# Patient Record
Sex: FEMALE | Race: WHITE | NOT HISPANIC OR LATINO | Employment: OTHER | URBAN - METROPOLITAN AREA
[De-identification: names, ages, dates, MRNs, and addresses within clinical notes are randomized per-mention and may not be internally consistent; named-entity substitution may affect disease eponyms.]

---

## 2017-07-21 ENCOUNTER — OFFICE VISIT (OUTPATIENT)
Dept: OBSTETRICS AND GYNECOLOGY | Age: 57
End: 2017-07-21

## 2017-07-21 VITALS
WEIGHT: 208 LBS | HEIGHT: 68 IN | DIASTOLIC BLOOD PRESSURE: 80 MMHG | BODY MASS INDEX: 31.52 KG/M2 | SYSTOLIC BLOOD PRESSURE: 110 MMHG

## 2017-07-21 DIAGNOSIS — Z01.419 WELL WOMAN EXAM WITH ROUTINE GYNECOLOGICAL EXAM: Primary | ICD-10-CM

## 2017-07-21 DIAGNOSIS — Z12.4 ROUTINE CERVICAL SMEAR: ICD-10-CM

## 2017-07-21 DIAGNOSIS — Z11.51 SCREENING FOR HPV (HUMAN PAPILLOMAVIRUS): ICD-10-CM

## 2017-07-21 PROCEDURE — 99396 PREV VISIT EST AGE 40-64: CPT | Performed by: OBSTETRICS & GYNECOLOGY

## 2017-07-21 NOTE — PROGRESS NOTES
Chief complaint:annual    Subjective   History of Present Illness    Ai Carreon is a 56 y.o.  who presents for annual exam. No c/o. Episode of postmenopausal bleeding last year, EMB negative, no further problems.      Obstetric History:  OB History      Para Term  AB TAB SAB Ectopic Multiple Living    3 3 3       3         Menstrual History:     No LMP recorded. Patient is postmenopausal.         Current contraception: tubal ligation  History of abnormal Pap smear: no  Received Gardasil immunization: no  Perform regular self breast exam: yes -    Family history of uterine or ovarian cancer: yes - both grandmothers had ovarian cancer, dx 80's  Family History of colon cancer: no  Family history of breast cancer: no    Mammogram: done today.  Colonoscopy: recommended.  DEXA: not indicated.    Exercise: not active  Calcium/Vitamin D: adequate intake    The following portions of the patient's history were reviewed and updated as appropriate: allergies, current medications, past family history, past medical history, past social history, past surgical history and problem list.    Review of Systems   Constitutional: Negative for activity change, fatigue, fever and unexpected weight change.   Respiratory: Negative for chest tightness and shortness of breath.    Cardiovascular: Negative for chest pain, palpitations and leg swelling.   Gastrointestinal: Negative for abdominal distention, abdominal pain, blood in stool, constipation, diarrhea, nausea and vomiting.   Endocrine: Negative for cold intolerance, heat intolerance, polydipsia, polyphagia and polyuria.   Genitourinary: Negative.    Musculoskeletal: Negative for arthralgias and back pain.   Skin: Negative for color change.   Neurological: Negative for weakness and headaches.   Hematological: Does not bruise/bleed easily.   Psychiatric/Behavioral: Negative for confusion.       Pertinent items are noted in HPI.     Objective   Physical Exam    BP  "110/80  Ht 68\" (172.7 cm)  Wt 208 lb (94.3 kg)  BMI 31.63 kg/m2    General:   alert, appears stated age and cooperative   Neck: no asymmetry, masses, or scars   Heart: regular rate and rhythm, S1, S2 normal, no murmur, click, rub or gallop   Lungs: clear to auscultation bilaterally   Abdomen: soft, non-tender, without masses or organomegaly   Breast: inspection negative, no nipple discharge or bleeding, no masses or nodularity palpable   Vulva: Bartholin's, Urethra, [place]'s normal   Vagina: normal mucosa   Cervix: no bleeding following Pap, no cervical motion tenderness and no lesions   Uterus: normal size, normal shape and consistency   Adnexa: normal adnexa and no mass, fullness, tenderness   Rectal: not indicated     Assessment/Plan   Ai was seen today for gynecologic exam.    Diagnoses and all orders for this visit:    Well woman exam with routine gynecological exam  -     PapIG, HPV, Rfx 16 / 18    Screening for HPV (human papillomavirus)  -     PapIG, HPV, Rfx 16 / 18      Breast self exam technique reviewed and patient encouraged to perform self-exam monthly.  Discussed healthy lifestyle modifications.  Pap smear sent     Recommend [facility] for BRCA gene screening, info given, pt will look into it and will probably come back for testing                 "

## 2017-07-26 LAB
CYTOLOGIST CVX/VAG CYTO: NORMAL
CYTOLOGY CVX/VAG DOC THIN PREP: NORMAL
DX ICD CODE: NORMAL
HIV 1 & 2 AB SER-IMP: NORMAL
HPV I/H RISK 4 DNA CVX QL PROBE+SIG AMP: NEGATIVE
Lab: NORMAL
OTHER STN SPEC: NORMAL
PATH REPORT.FINAL DX SPEC: NORMAL
STAT OF ADQ CVX/VAG CYTO-IMP: NORMAL

## 2017-07-27 ENCOUNTER — TELEPHONE (OUTPATIENT)
Dept: OBSTETRICS AND GYNECOLOGY | Age: 57
End: 2017-07-27

## 2018-07-27 ENCOUNTER — APPOINTMENT (OUTPATIENT)
Dept: WOMENS IMAGING | Facility: HOSPITAL | Age: 58
End: 2018-07-27

## 2018-07-27 ENCOUNTER — OFFICE VISIT (OUTPATIENT)
Dept: OBSTETRICS AND GYNECOLOGY | Age: 58
End: 2018-07-27

## 2018-07-27 VITALS
DIASTOLIC BLOOD PRESSURE: 64 MMHG | BODY MASS INDEX: 30.25 KG/M2 | HEIGHT: 68 IN | WEIGHT: 199.6 LBS | SYSTOLIC BLOOD PRESSURE: 112 MMHG

## 2018-07-27 DIAGNOSIS — Z80.41 FAMILY HISTORY OF OVARIAN CANCER: ICD-10-CM

## 2018-07-27 DIAGNOSIS — Z01.419 WELL WOMAN EXAM WITH ROUTINE GYNECOLOGICAL EXAM: Primary | ICD-10-CM

## 2018-07-27 PROCEDURE — 99396 PREV VISIT EST AGE 40-64: CPT | Performed by: OBSTETRICS & GYNECOLOGY

## 2018-07-27 PROCEDURE — 77067 SCR MAMMO BI INCL CAD: CPT | Performed by: RADIOLOGY

## 2018-07-27 NOTE — PROGRESS NOTES
Chief complaint: annual    Subjective   History of Present Illness    Ai Carreon is a 57 y.o.  who presents for annual exam. No postmenopausal bleeding. NO gyn c/o. Didn't do MyRisk testing. Was considering testing for BRCA genes due to family h/o ovarian cancer. Both grandmothers w/ ovarian cancer dx in 80s.   2017 pap normal, HPV-    Soc Hx- pre- at [facility]    Obstetric History:  OB History      Para Term  AB Living    3 3 3     3    SAB TAB Ectopic Molar Multiple Live Births              3         Menstrual History:     No LMP recorded. Patient is postmenopausal.         Current contraception: tubal ligation  History of abnormal Pap smear: no  Received Gardasil immunization:    Perform regular self breast exam: yes  Family history of uterine or ovarian cancer: yes, bother grandmother w/ ovarian cancer dx 80s  Family History of colon cancer: no  Family history of breast cancer: no    Mammogram: done today.  Colonoscopy: recommended.  DEXA: not indicated.    Exercise: moderately active  Calcium/Vitamin D: adequate intake    The following portions of the patient's history were reviewed and updated as appropriate: allergies, current medications, past family history, past medical history, past social history, past surgical history and problem list.    Review of Systems   Constitutional: Negative for activity change, fatigue, fever and unexpected weight change.   Respiratory: Negative for chest tightness and shortness of breath.    Cardiovascular: Negative for chest pain, palpitations and leg swelling.   Gastrointestinal: Negative for abdominal distention, abdominal pain, blood in stool, constipation, diarrhea, nausea and vomiting.   Endocrine: Negative for cold intolerance, heat intolerance, polydipsia, polyphagia and polyuria.   Genitourinary: Negative.    Musculoskeletal: Negative for arthralgias and back pain.   Skin: Negative for color change.  "  Neurological: Negative for weakness and headaches.   Hematological: Does not bruise/bleed easily.   Psychiatric/Behavioral: Negative for confusion.       Pertinent items are noted in HPI.     Objective   Physical Exam    /64   Ht 172.7 cm (68\")   Wt 90.5 kg (199 lb 9.6 oz)   BMI 30.35 kg/m²     General:   alert, appears stated age and cooperative   Neck: no asymmetry, masses, or scars   Heart: regular rate and rhythm, S1, S2 normal, no murmur, click, rub or gallop   Lungs: clear to auscultation bilaterally   Abdomen: soft, non-tender, without masses or organomegaly   Breast: inspection negative, no nipple discharge or bleeding, no masses or nodularity palpable   Vulva: normal, Bartholin's, Urethra, [place]'s normal   Vagina: normal mucosa   Cervix: no cervical motion tenderness and no lesions   Uterus: normal size, mobile, non-tender, normal shape and consistency   Adnexa: normal adnexa and no mass, fullness, tenderness   Rectal: not indicated     Assessment/Plan   Ai was seen today for gynecologic exam.    Diagnoses and all orders for this visit:    Well woman exam with routine gynecological exam    Family history of ovarian cancer    pt desires testing for cancer genes, info given for [facility]    Recommend screening colonoscopy q 10 yrs      Breast self exam technique reviewed and patient encouraged to perform self-exam monthly.  Discussed healthy lifestyle modifications.  Pap smear up to date, plan q 3 yrs                 "

## 2019-08-07 ENCOUNTER — OFFICE VISIT (OUTPATIENT)
Dept: OBSTETRICS AND GYNECOLOGY | Age: 59
End: 2019-08-07

## 2019-08-07 ENCOUNTER — APPOINTMENT (OUTPATIENT)
Dept: WOMENS IMAGING | Facility: HOSPITAL | Age: 59
End: 2019-08-07

## 2019-08-07 ENCOUNTER — PROCEDURE VISIT (OUTPATIENT)
Dept: OBSTETRICS AND GYNECOLOGY | Age: 59
End: 2019-08-07

## 2019-08-07 VITALS
HEIGHT: 68 IN | BODY MASS INDEX: 28.95 KG/M2 | DIASTOLIC BLOOD PRESSURE: 72 MMHG | SYSTOLIC BLOOD PRESSURE: 140 MMHG | WEIGHT: 191 LBS

## 2019-08-07 DIAGNOSIS — Z01.419 WELL WOMAN EXAM WITH ROUTINE GYNECOLOGICAL EXAM: Primary | ICD-10-CM

## 2019-08-07 DIAGNOSIS — Z12.31 VISIT FOR SCREENING MAMMOGRAM: Primary | ICD-10-CM

## 2019-08-07 DIAGNOSIS — Z80.41 FAMILY HISTORY OF OVARIAN CANCER: ICD-10-CM

## 2019-08-07 DIAGNOSIS — Z12.11 SCREENING FOR COLON CANCER: ICD-10-CM

## 2019-08-07 PROCEDURE — 99396 PREV VISIT EST AGE 40-64: CPT | Performed by: OBSTETRICS & GYNECOLOGY

## 2019-08-07 PROCEDURE — 77067 SCR MAMMO BI INCL CAD: CPT | Performed by: RADIOLOGY

## 2019-08-07 PROCEDURE — 77067 SCR MAMMO BI INCL CAD: CPT | Performed by: OBSTETRICS & GYNECOLOGY

## 2019-08-07 NOTE — PROGRESS NOTES
"Chief complaint:    Subjective   History of Present Illness    Ai Carreon is a 58 y.o.  who presents for annual exam. No c/o  Her menses are absent, no postmenopausal bleeding. Unable to find new primary care provider.  2017 pap normal HPV-  MMG today  Needs colonoscopy    Obstetric History:  OB History      Para Term  AB Living    3 3 3     3    SAB TAB Ectopic Molar Multiple Live Births              3         Menstrual History:     No LMP recorded. Patient is postmenopausal.   Exercise: moderately active  Calcium/Vitamin D: adequate intake    The following portions of the patient's history were reviewed and updated as appropriate: allergies, current medications, past family history, past medical history, past social history, past surgical history and problem list.    Review of Systems   All systems negative    Pertinent items are noted in HPI.     Objective   Physical Exam    /72   Ht 172.7 cm (68\")   Wt 86.6 kg (191 lb)   Breastfeeding? No   BMI 29.04 kg/m²     General:   alert, appears stated age and cooperative   Neck: no asymmetry, masses, or scars   Heart: regular rate and rhythm, S1, S2 normal, no murmur, click, rub or gallop   Lungs: clear to auscultation bilaterally   Abdomen: soft, non-tender, without masses or organomegaly   Breast: inspection negative, no nipple discharge or bleeding, no masses or nodularity palpable   Vulva: normal, Bartholin's, Urethra, [place]'s normal   Vagina: normal mucosa   Cervix: no bleeding following Pap   Uterus: normal size, anteverted, mobile, non-tender, normal shape and consistency   Adnexa: normal adnexa and no mass, fullness, tenderness   Rectal: not indicated     Assessment/Plan   Ai was seen today for gynecologic exam.    Diagnoses and all orders for this visit:    Well woman exam with routine gynecological exam    Screening for colon cancer  -     Ambulatory Referral For Screening Colonoscopy    Family history of ovarian " cancer    pt's mother is BRCA gene negative (therefore pt does not need to be tested)    Breast self exam technique reviewed and patient encouraged to perform self-exam monthly.  Discussed healthy lifestyle modifications.  Pap smear up to date    Referred to primary care provider (numbers given)

## 2019-08-19 ENCOUNTER — PREP FOR SURGERY (OUTPATIENT)
Dept: OTHER | Facility: HOSPITAL | Age: 59
End: 2019-08-19

## 2019-08-19 DIAGNOSIS — Z12.11 SCREENING FOR COLON CANCER: Primary | ICD-10-CM

## 2019-09-11 ENCOUNTER — OFFICE VISIT (OUTPATIENT)
Dept: FAMILY MEDICINE CLINIC | Facility: CLINIC | Age: 59
End: 2019-09-11

## 2019-09-11 VITALS
WEIGHT: 188.8 LBS | BODY MASS INDEX: 28.61 KG/M2 | HEIGHT: 68 IN | SYSTOLIC BLOOD PRESSURE: 132 MMHG | HEART RATE: 84 BPM | TEMPERATURE: 97.7 F | OXYGEN SATURATION: 94 % | DIASTOLIC BLOOD PRESSURE: 84 MMHG

## 2019-09-11 DIAGNOSIS — R73.9 HYPERGLYCEMIA: Primary | ICD-10-CM

## 2019-09-11 DIAGNOSIS — E11.9 TYPE 2 DIABETES MELLITUS WITHOUT COMPLICATION, WITHOUT LONG-TERM CURRENT USE OF INSULIN (HCC): ICD-10-CM

## 2019-09-11 LAB — HBA1C MFR BLD: 12.6 %

## 2019-09-11 PROCEDURE — 83036 HEMOGLOBIN GLYCOSYLATED A1C: CPT | Performed by: FAMILY MEDICINE

## 2019-09-11 PROCEDURE — 99204 OFFICE O/P NEW MOD 45 MIN: CPT | Performed by: FAMILY MEDICINE

## 2019-09-11 RX ORDER — METFORMIN HYDROCHLORIDE EXTENDED-RELEASE TABLETS 1000 MG/1
1000 TABLET, FILM COATED, EXTENDED RELEASE ORAL 2 TIMES DAILY WITH MEALS
Qty: 180 TABLET | Refills: 3 | Status: SHIPPED | OUTPATIENT
Start: 2019-09-11 | End: 2019-10-14 | Stop reason: CLARIF

## 2019-09-11 NOTE — PROGRESS NOTES
Subjective   Ai Carreon is a 58 y.o. female.     Chief Complaint   Patient presents with   • Blood Sugar Problem       History of Present Illness   Had a random BS of 300 a week ago. Has not had a diagnosis of DM2 before and is here to see if she has this diagnosis. No thirst or other symptoms etc. A1C 12.4 today. Unknown how long this has gone on. Discussed keeping BS log etc. BS worse when she eats wrong. Has tested a few times. severely uncontrolled.     The following portions of the patient's history were reviewed and updated as appropriate: allergies, current medications, past family history, past medical history, past social history, past surgical history and problem list.    Past Medical History:   Diagnosis Date   • Kidney stone        Past Surgical History:   Procedure Laterality Date   • PELVIC LAPAROSCOPY  2002    LSO, serous cystadenoma   • TUBAL ABDOMINAL LIGATION         Family History   Problem Relation Age of Onset   • No Known Problems Mother    • Coronary artery disease Father    • No Known Problems Sister    • No Known Problems Brother    • No Known Problems Daughter    • No Known Problems Son    • Ovarian cancer Maternal Grandmother    • No Known Problems Maternal Aunt    • No Known Problems Paternal Aunt    • BRCA 1/2 Neg Hx    • Breast cancer Neg Hx    • Colon cancer Neg Hx    • Endometrial cancer Neg Hx    • Uterine cancer Neg Hx        Social History     Socioeconomic History   • Marital status:      Spouse name: Not on file   • Number of children: Not on file   • Years of education: Not on file   • Highest education level: Not on file   Tobacco Use   • Smoking status: Never Smoker   • Smokeless tobacco: Never Used   Substance and Sexual Activity   • Alcohol use: No   • Drug use: No       Review of Systems   Constitutional: Negative for fatigue and fever.   HENT: Negative for ear pain and hearing loss.    Eyes: Negative for pain and visual disturbance.   Respiratory: Negative for  "chest tightness and shortness of breath.    Cardiovascular: Negative for chest pain and palpitations.   Gastrointestinal: Negative for constipation and diarrhea.   Genitourinary: Negative for dysuria and urinary incontinence.   Musculoskeletal: Negative for arthralgias and myalgias.   Skin: Negative for rash and skin lesions.   Neurological: Negative for headache and memory problem.   Psychiatric/Behavioral: Negative for depressed mood. The patient is not nervous/anxious.        Objective   Visit Vitals  /84 (BP Location: Right arm, Patient Position: Sitting)   Pulse 84   Temp 97.7 °F (36.5 °C)   Ht 171.5 cm (67.5\")   Wt 85.6 kg (188 lb 12.8 oz)   SpO2 94%   BMI 29.13 kg/m²     Body mass index is 29.13 kg/m².  Physical Exam   Constitutional: She is oriented to person, place, and time. She appears well-developed and well-nourished.   Cardiovascular: Normal rate, regular rhythm, normal heart sounds and intact distal pulses.   Pulmonary/Chest: Effort normal and breath sounds normal.   Musculoskeletal: Normal range of motion. She exhibits no edema.   Neurological: She is alert and oriented to person, place, and time.   Skin: Skin is warm and dry.   Psychiatric: She has a normal mood and affect. Her behavior is normal.         Assessment/Plan   Ai was seen today for blood sugar problem.    Diagnoses and all orders for this visit:    Hyperglycemia  -     POC Glycosylated Hemoglobin (Hb A1C)    Type 2 diabetes mellitus without complication, without long-term current use of insulin (CMS/[facility])  -     metFORMIN (FORTAMET) 1000 MG (OSM) 24 hr tablet; Take 1 tablet by mouth 2 (Two) Times a Day With Meals.  -     Ambulatory Referral to Diabetic Education  -     Lipid Panel  -     Comprehensive Metabolic Panel  -     TSH Rfx On Abnormal To Free T4  -     CBC & Differential        R and B discussed and keep BS log and f/u in 1 month. Discussed diet. Gave glucometer script.        "

## 2019-09-12 ENCOUNTER — TELEPHONE (OUTPATIENT)
Dept: FAMILY MEDICINE CLINIC | Facility: CLINIC | Age: 59
End: 2019-09-12

## 2019-09-12 LAB
ALBUMIN SERPL-MCNC: 4.7 G/DL (ref 3.5–5.2)
ALBUMIN/GLOB SERPL: 2 G/DL
ALP SERPL-CCNC: 171 U/L (ref 39–117)
ALT SERPL-CCNC: 63 U/L (ref 1–33)
AST SERPL-CCNC: 38 U/L (ref 1–32)
BASOPHILS # BLD AUTO: 0.02 10*3/MM3 (ref 0–0.2)
BASOPHILS NFR BLD AUTO: 0.4 % (ref 0–1.5)
BILIRUB SERPL-MCNC: 0.5 MG/DL (ref 0.2–1.2)
BUN SERPL-MCNC: 15 MG/DL (ref 6–20)
BUN/CREAT SERPL: 22.1 (ref 7–25)
CALCIUM SERPL-MCNC: 10.7 MG/DL (ref 8.6–10.5)
CHLORIDE SERPL-SCNC: 98 MMOL/L (ref 98–107)
CHOLEST SERPL-MCNC: 234 MG/DL (ref 0–200)
CO2 SERPL-SCNC: 26.5 MMOL/L (ref 22–29)
CREAT SERPL-MCNC: 0.68 MG/DL (ref 0.57–1)
EOSINOPHIL # BLD AUTO: 0.09 10*3/MM3 (ref 0–0.4)
EOSINOPHIL NFR BLD AUTO: 2 % (ref 0.3–6.2)
ERYTHROCYTE [DISTWIDTH] IN BLOOD BY AUTOMATED COUNT: 12.3 % (ref 12.3–15.4)
GLOBULIN SER CALC-MCNC: 2.3 GM/DL
GLUCOSE SERPL-MCNC: 224 MG/DL (ref 65–99)
HCT VFR BLD AUTO: 46.9 % (ref 34–46.6)
HDLC SERPL-MCNC: 33 MG/DL (ref 40–60)
HGB BLD-MCNC: 15.1 G/DL (ref 12–15.9)
IMM GRANULOCYTES # BLD AUTO: 0.02 10*3/MM3 (ref 0–0.05)
IMM GRANULOCYTES NFR BLD AUTO: 0.4 % (ref 0–0.5)
LDLC SERPL CALC-MCNC: 140 MG/DL (ref 0–100)
LYMPHOCYTES # BLD AUTO: 0.95 10*3/MM3 (ref 0.7–3.1)
LYMPHOCYTES NFR BLD AUTO: 21.3 % (ref 19.6–45.3)
MCH RBC QN AUTO: 29.7 PG (ref 26.6–33)
MCHC RBC AUTO-ENTMCNC: 32.2 G/DL (ref 31.5–35.7)
MCV RBC AUTO: 92.3 FL (ref 79–97)
MONOCYTES # BLD AUTO: 0.44 10*3/MM3 (ref 0.1–0.9)
MONOCYTES NFR BLD AUTO: 9.8 % (ref 5–12)
NEUTROPHILS # BLD AUTO: 2.95 10*3/MM3 (ref 1.7–7)
NEUTROPHILS NFR BLD AUTO: 66.1 % (ref 42.7–76)
NRBC BLD AUTO-RTO: 0 /100 WBC (ref 0–0.2)
PLATELET # BLD AUTO: 194 10*3/MM3 (ref 140–450)
POTASSIUM SERPL-SCNC: 4.9 MMOL/L (ref 3.5–5.2)
PROT SERPL-MCNC: 7 G/DL (ref 6–8.5)
RBC # BLD AUTO: 5.08 10*6/MM3 (ref 3.77–5.28)
SODIUM SERPL-SCNC: 138 MMOL/L (ref 136–145)
TRIGL SERPL-MCNC: 305 MG/DL (ref 0–150)
TSH SERPL DL<=0.005 MIU/L-ACNC: 1.05 UIU/ML (ref 0.27–4.2)
VLDLC SERPL CALC-MCNC: 61 MG/DL
WBC # BLD AUTO: 4.47 10*3/MM3 (ref 3.4–10.8)

## 2019-09-12 NOTE — TELEPHONE ENCOUNTER
Pt called and stated the Metformin 1000mg ER is not cost effective ($4700.00). Pt contacted her insurance and they informed her Metformin 500mg ER or Metformin 750mg ER is more cost effective.

## 2019-09-13 DIAGNOSIS — E78.2 MIXED HYPERLIPIDEMIA: Primary | ICD-10-CM

## 2019-09-13 DIAGNOSIS — E11.9 TYPE 2 DIABETES MELLITUS WITHOUT COMPLICATION, WITHOUT LONG-TERM CURRENT USE OF INSULIN (HCC): Primary | ICD-10-CM

## 2019-09-13 DIAGNOSIS — E78.2 MIXED HYPERLIPIDEMIA: ICD-10-CM

## 2019-09-13 RX ORDER — ATORVASTATIN CALCIUM 20 MG/1
20 TABLET, FILM COATED ORAL DAILY
Qty: 90 TABLET | Refills: 3 | Status: SHIPPED | OUTPATIENT
Start: 2019-09-13 | End: 2019-09-13 | Stop reason: SDUPTHER

## 2019-09-13 RX ORDER — ATORVASTATIN CALCIUM 20 MG/1
20 TABLET, FILM COATED ORAL DAILY
Qty: 90 TABLET | Refills: 3 | Status: SHIPPED | OUTPATIENT
Start: 2019-09-13 | End: 2019-10-14 | Stop reason: SDUPTHER

## 2019-09-13 NOTE — TELEPHONE ENCOUNTER
PT CALLED AND REQUESTED THE NEW RX FOR METFORIM 500 PLEASE BE SENT INTO THE [facility], I ADDED TO CHART, PLEASE LIVIA PT WHEN RX IS SENT OVER!!!!! THANK YOU

## 2019-09-18 DIAGNOSIS — E11.9 TYPE 2 DIABETES MELLITUS WITHOUT COMPLICATION, WITHOUT LONG-TERM CURRENT USE OF INSULIN (HCC): Primary | ICD-10-CM

## 2019-09-18 RX ORDER — METFORMIN HYDROCHLORIDE 500 MG/1
1000 TABLET, EXTENDED RELEASE ORAL 2 TIMES DAILY
Qty: 360 TABLET | Refills: 2 | Status: SHIPPED | OUTPATIENT
Start: 2019-09-18 | End: 2019-10-31 | Stop reason: SDUPTHER

## 2019-10-01 ENCOUNTER — HOSPITAL ENCOUNTER (OUTPATIENT)
Dept: DIABETES SERVICES | Facility: HOSPITAL | Age: 59
Setting detail: RECURRING SERIES
Discharge: HOME | End: 2019-10-01

## 2019-10-01 PROCEDURE — G0109 DIAB MANAGE TRN IND/GROUP: HCPCS | Performed by: DIETITIAN, REGISTERED

## 2019-10-01 PROCEDURE — 97802 MEDICAL NUTRITION INDIV IN: CPT | Performed by: DIETITIAN, REGISTERED

## 2019-10-08 ENCOUNTER — APPOINTMENT (OUTPATIENT)
Dept: DIABETES SERVICES | Facility: HOSPITAL | Age: 59
End: 2019-10-08

## 2019-10-10 ENCOUNTER — ANESTHESIA (OUTPATIENT)
Dept: GASTROENTEROLOGY | Facility: HOSPITAL | Age: 59
End: 2019-10-10

## 2019-10-10 ENCOUNTER — HOSPITAL ENCOUNTER (OUTPATIENT)
Facility: HOSPITAL | Age: 59
Setting detail: HOSPITAL OUTPATIENT SURGERY
Discharge: HOME | End: 2019-10-10
Attending: COLON & RECTAL SURGERY | Admitting: COLON & RECTAL SURGERY

## 2019-10-10 ENCOUNTER — ANESTHESIA EVENT (OUTPATIENT)
Dept: GASTROENTEROLOGY | Facility: HOSPITAL | Age: 59
End: 2019-10-10

## 2019-10-10 VITALS
HEART RATE: 68 BPM | HEIGHT: 67 IN | OXYGEN SATURATION: 96 % | DIASTOLIC BLOOD PRESSURE: 62 MMHG | SYSTOLIC BLOOD PRESSURE: 98 MMHG | BODY MASS INDEX: 27.78 KG/M2 | TEMPERATURE: 98.2 F | RESPIRATION RATE: 18 BRPM | WEIGHT: 177 LBS

## 2019-10-10 DIAGNOSIS — Z12.11 SCREENING FOR COLON CANCER: ICD-10-CM

## 2019-10-10 LAB — GLUCOSE BLDC GLUCOMTR-MCNC: 100 MG/DL (ref 70–130)

## 2019-10-10 PROCEDURE — 82962 GLUCOSE BLOOD TEST: CPT

## 2019-10-10 PROCEDURE — 25010000002 PROPOFOL 10 MG/ML EMULSION: Performed by: NURSE ANESTHETIST, CERTIFIED REGISTERED

## 2019-10-10 PROCEDURE — 88305 TISSUE EXAM BY PATHOLOGIST: CPT | Performed by: COLON & RECTAL SURGERY

## 2019-10-10 PROCEDURE — 45380 COLONOSCOPY AND BIOPSY: CPT | Performed by: COLON & RECTAL SURGERY

## 2019-10-10 RX ORDER — SODIUM CHLORIDE 0.9 % (FLUSH) 0.9 %
10 SYRINGE (ML) INJECTION AS NEEDED
Status: DISCONTINUED | OUTPATIENT
Start: 2019-10-10 | End: 2019-10-10 | Stop reason: HOSPADM

## 2019-10-10 RX ORDER — PROPOFOL 10 MG/ML
VIAL (ML) INTRAVENOUS AS NEEDED
Status: DISCONTINUED | OUTPATIENT
Start: 2019-10-10 | End: 2019-10-10 | Stop reason: SURG

## 2019-10-10 RX ORDER — LIDOCAINE HYDROCHLORIDE 10 MG/ML
0.5 INJECTION, SOLUTION INFILTRATION; PERINEURAL ONCE AS NEEDED
Status: DISCONTINUED | OUTPATIENT
Start: 2019-10-10 | End: 2019-10-10 | Stop reason: HOSPADM

## 2019-10-10 RX ORDER — LIDOCAINE HYDROCHLORIDE 20 MG/ML
INJECTION, SOLUTION INFILTRATION; PERINEURAL AS NEEDED
Status: DISCONTINUED | OUTPATIENT
Start: 2019-10-10 | End: 2019-10-10 | Stop reason: SURG

## 2019-10-10 RX ORDER — SODIUM CHLORIDE, SODIUM LACTATE, POTASSIUM CHLORIDE, CALCIUM CHLORIDE 600; 310; 30; 20 MG/100ML; MG/100ML; MG/100ML; MG/100ML
1000 INJECTION, SOLUTION INTRAVENOUS CONTINUOUS
Status: DISCONTINUED | OUTPATIENT
Start: 2019-10-10 | End: 2019-10-10 | Stop reason: HOSPADM

## 2019-10-10 RX ORDER — PROPOFOL 10 MG/ML
VIAL (ML) INTRAVENOUS CONTINUOUS PRN
Status: DISCONTINUED | OUTPATIENT
Start: 2019-10-10 | End: 2019-10-10 | Stop reason: SURG

## 2019-10-10 RX ADMIN — PROPOFOL 80 MG: 10 INJECTION, EMULSION INTRAVENOUS at 14:09

## 2019-10-10 RX ADMIN — LIDOCAINE HYDROCHLORIDE 60 MG: 20 INJECTION, SOLUTION INFILTRATION; PERINEURAL at 14:09

## 2019-10-10 RX ADMIN — PROPOFOL 250 MCG/KG/MIN: 10 INJECTION, EMULSION INTRAVENOUS at 14:09

## 2019-10-10 RX ADMIN — SODIUM CHLORIDE, POTASSIUM CHLORIDE, SODIUM LACTATE AND CALCIUM CHLORIDE 1000 ML: 600; 310; 30; 20 INJECTION, SOLUTION INTRAVENOUS at 13:03

## 2019-10-10 NOTE — H&P
Ai Carreon is a 59 y.o. female  who is referred by Jacob Vasquez MD for a colonoscopy. She is an  has a history of screening for colon cancer.     She denies any change in bowel function, melena, or hematochezia.    Past Medical History:   Diagnosis Date   • Diabetes mellitus (CMS/HCC)     TYPE 2   • Hyperglycemia    • Hyperlipidemia     MIXED   • Kidney stone     SEEN IN PAST BY DR. HARDEEP VILLASENOR   • Pericarditis 1977   • Postmenopausal    • Right ovarian cyst 2002    S/P EXCISION       Past Surgical History:   Procedure Laterality Date   • CYSTOSCOPY INSERTION / REMOVAL STENT / STONE N/A 10/31/2001    PLACEMENT OF LEFT DOUBLE J STENT, 6x24, DR. HARDEEP VILLASENOR AT [facility]   • LAPAROSCOPIC SALPINGOOPHERECTOMY Left 01/24/2002     AND EXCISION OF RIGHT OVARIAN CYST, DR. CHANDLER HAIR AT [facility]   • TUBAL ABDOMINAL LIGATION Bilateral 1990    POSTPARTUM   • URETEROSCOPY STENT INSERTION N/A 07/13/2009    WITH STONE EXTRACTION, DR. HARDEEP VILLASENOR AT [facility]       Medications Prior to Admission   Medication Sig Dispense Refill Last Dose   • metFORMIN ER (GLUCOPHAGE XR) 500 MG 24 hr tablet Take 2 tablets by mouth 2 (Two) Times a Day. 360 tablet 2 10/9/2019 at Unknown time   • atorvastatin (LIPITOR) 20 MG tablet Take 1 tablet by mouth Daily. 90 tablet 3 Unknown at Unknown time   • metFORMIN (FORTAMET) 1000 MG (OSM) 24 hr tablet Take 1 tablet by mouth 2 (Two) Times a Day With Meals. 180 tablet 3    • metFORMIN (GLUCOPHAGE) 500 MG tablet Take 1 tablet by mouth 2 (Two) Times a Day With Meals for 30 days. 60 tablet 1        No Known Allergies    Family History   Problem Relation Age of Onset   • Melanoma Mother    • Cancer Mother    • Coronary artery disease Father    • Prostate cancer Father    • Diabetes Father    • Heart disease Father    • No Known Problems Sister    • Cancer Brother    • Melanoma Brother    • Prostate cancer Brother    • No Known Problems Daughter    • No Known Problems Son    • Ovarian cancer Maternal Grandmother    •  Cancer Maternal Grandmother    • No Known Problems Maternal Aunt    • No Known Problems Paternal Aunt    • Ovarian cancer Paternal Grandmother    • Cancer Paternal Grandmother    • BRCA 1/2 Neg Hx    • Breast cancer Neg Hx    • Colon cancer Neg Hx    • Endometrial cancer Neg Hx    • Uterine cancer Neg Hx        Social History     Socioeconomic History   • Marital status:      Spouse name: Not on file   • Number of children: Not on file   • Years of education: Not on file   • Highest education level: Not on file   Tobacco Use   • Smoking status: Never Smoker   • Smokeless tobacco: Never Used   Substance and Sexual Activity   • Alcohol use: No   • Drug use: No   • Sexual activity: Defer     Birth control/protection: Post-menopausal, Surgical       Review of Systems   Gastrointestinal: Negative for abdominal pain, nausea and vomiting.   All other systems reviewed and are negative.      Vitals:    10/10/19 1252   BP: 117/72   Pulse: 63   Resp: 20   Temp: 98.2 °F (36.8 °C)   SpO2: 97%         Physical Exam   Constitutional: She is oriented to person, place, and time. She appears well-developed and well-nourished.   HENT:   Head: Normocephalic and atraumatic.   Eyes: EOM are normal. Pupils are equal, round, and reactive to light.   Cardiovascular: Regular rhythm.   Pulmonary/Chest: Effort normal.   Abdominal: Soft. She exhibits no distension.   Musculoskeletal: Normal range of motion.   Neurological: She is alert and oriented to person, place, and time.   Skin: Skin is warm and dry.   Psychiatric: Judgment and thought content normal.         Assessment/Plan      screening for colon cancer.         I recommend colonoscopy.  I described risks, benefits of the procedure with the patient including but not limited to bleeding, infection, possibility of perforation and possible polypectomy. All of the patient's questions were answered and they would like to proceed with the above recommendations.

## 2019-10-10 NOTE — ANESTHESIA POSTPROCEDURE EVALUATION
Patient: Ai Carreon    Procedure Summary     Date:  10/10/19 Room / Location:  [facility] ENDOSCOPY 9 / [facility] ENDOSCOPY    Anesthesia Start:  1404 Anesthesia Stop:  1432    Procedure:  COLONOSCOPY to cecum with biopsies and polypectomy (N/A ) Diagnosis:       Screening for colon cancer      (Screening for colon cancer [Z12.11])    Surgeon:  Jacob Vasquez MD Provider:  Jl De MD    Anesthesia Type:  MAC ASA Status:  3          Anesthesia Type: MAC  Last vitals  BP   90/48 (10/10/19 1432)   Temp   36.8 °C (98.2 °F) (10/10/19 1252)   Pulse   64 (10/10/19 1432)   Resp   18 (10/10/19 1432)     SpO2   98 % (10/10/19 1432)     Post Anesthesia Care and Evaluation    Patient location during evaluation: bedside  Patient participation: complete - patient participated  Level of consciousness: awake and alert  Pain management: adequate  Airway patency: patent  Anesthetic complications: No anesthetic complications  PONV Status: controlled  Cardiovascular status: blood pressure returned to baseline and acceptable  Respiratory status: acceptable  Hydration status: acceptable

## 2019-10-11 LAB
CYTO UR: NORMAL
LAB AP CASE REPORT: NORMAL
PATH REPORT.FINAL DX SPEC: NORMAL
PATH REPORT.GROSS SPEC: NORMAL

## 2019-10-14 ENCOUNTER — OFFICE VISIT (OUTPATIENT)
Dept: FAMILY MEDICINE CLINIC | Facility: CLINIC | Age: 59
End: 2019-10-14

## 2019-10-14 VITALS
SYSTOLIC BLOOD PRESSURE: 130 MMHG | DIASTOLIC BLOOD PRESSURE: 72 MMHG | HEART RATE: 61 BPM | TEMPERATURE: 98.4 F | BODY MASS INDEX: 27.58 KG/M2 | HEIGHT: 68 IN | WEIGHT: 182 LBS | OXYGEN SATURATION: 97 %

## 2019-10-14 DIAGNOSIS — E78.2 MIXED HYPERLIPIDEMIA: ICD-10-CM

## 2019-10-14 DIAGNOSIS — E11.9 TYPE 2 DIABETES MELLITUS WITHOUT COMPLICATION, WITHOUT LONG-TERM CURRENT USE OF INSULIN (HCC): Primary | ICD-10-CM

## 2019-10-14 DIAGNOSIS — E06.1 DE QUERVAIN THYROIDITIS: ICD-10-CM

## 2019-10-14 PROCEDURE — 99214 OFFICE O/P EST MOD 30 MIN: CPT | Performed by: FAMILY MEDICINE

## 2019-10-14 RX ORDER — ATORVASTATIN CALCIUM 20 MG/1
20 TABLET, FILM COATED ORAL DAILY
Qty: 90 TABLET | Refills: 3 | Status: SHIPPED | OUTPATIENT
Start: 2019-10-14 | End: 2020-01-28 | Stop reason: SDUPTHER

## 2019-10-14 NOTE — PROGRESS NOTES
Subjective   Ai Carreon is a 59 y.o. female.     Chief Complaint   Patient presents with   • Diabetes     Follow up        History of Present Illness   dm2- doing well on meds, fasting bs 115 on average. No SE. Has seen dietician and working hard on diet. Has lost 6 pounds.     hld- did not start medication as it was too expensive and needs it sent to [facility]    Wrist pain for a year since she had an injury falling on it. Worse with picking up something heavy or throwing. Sharp. In wrist and base of thumb. Using her phone a lot recently.     The following portions of the patient's history were reviewed and updated as appropriate: allergies, current medications, past family history, past medical history, past social history, past surgical history and problem list.    Past Medical History:   Diagnosis Date   • Diabetes mellitus (CMS/HCC)     TYPE 2   • Hyperglycemia    • Hyperlipidemia     MIXED   • Kidney stone     SEEN IN PAST BY DR. HARDEEP VILLASENOR   • Pericarditis 1977   • Postmenopausal    • Right ovarian cyst 2002    S/P EXCISION       Past Surgical History:   Procedure Laterality Date   • COLONOSCOPY N/A 10/10/2019    Procedure: COLONOSCOPY to cecum with biopsies and polypectomy;  Surgeon: Jacob Vasquez MD;  Location: [facility] ENDOSCOPY;  Service: General   • CYSTOSCOPY INSERTION / REMOVAL STENT / STONE N/A 10/31/2001    PLACEMENT OF LEFT DOUBLE J STENT, 6x24, DR. HARDEEP VILLASENOR AT [facility]   • LAPAROSCOPIC SALPINGOOPHERECTOMY Left 01/24/2002     AND EXCISION OF RIGHT OVARIAN CYST, DR. CHANDLER HAIR AT [facility]   • TUBAL ABDOMINAL LIGATION Bilateral 1990    POSTPARTUM   • URETEROSCOPY STENT INSERTION N/A 07/13/2009    WITH STONE EXTRACTION, DR. HARDEEP VILLASENOR AT [facility]       Family History   Problem Relation Age of Onset   • Melanoma Mother    • Cancer Mother    • Coronary artery disease Father    • Prostate cancer Father    • Diabetes Father    • Heart disease Father    • No Known Problems Sister    • Cancer Brother    •  "Melanoma Brother    • Prostate cancer Brother    • No Known Problems Daughter    • No Known Problems Son    • Ovarian cancer Maternal Grandmother    • Cancer Maternal Grandmother    • No Known Problems Maternal Aunt    • No Known Problems Paternal Aunt    • Ovarian cancer Paternal Grandmother    • Cancer Paternal Grandmother    • BRCA 1/2 Neg Hx    • Breast cancer Neg Hx    • Colon cancer Neg Hx    • Endometrial cancer Neg Hx    • Uterine cancer Neg Hx        Social History     Socioeconomic History   • Marital status:      Spouse name: Not on file   • Number of children: Not on file   • Years of education: Not on file   • Highest education level: Not on file   Tobacco Use   • Smoking status: Never Smoker   • Smokeless tobacco: Never Used   Substance and Sexual Activity   • Alcohol use: No   • Drug use: No   • Sexual activity: Defer     Birth control/protection: Post-menopausal, Surgical       Review of Systems   Respiratory: Negative for shortness of breath.    Cardiovascular: Negative for chest pain.       Objective   Visit Vitals  /72   Pulse 61   Temp 98.4 °F (36.9 °C) (Temporal)   Ht 171.5 cm (67.5\")   Wt 82.6 kg (182 lb)   SpO2 97%   BMI 28.08 kg/m²     Body mass index is 28.08 kg/m².  Physical Exam   Constitutional: She is oriented to person, place, and time. She appears well-developed and well-nourished.   Cardiovascular: Normal rate, regular rhythm, normal heart sounds and intact distal pulses.   Pulmonary/Chest: Effort normal and breath sounds normal.   Musculoskeletal: Normal range of motion. She exhibits no edema.   Right finkelstein positive   Neurological: She is alert and oriented to person, place, and time.   Skin: Skin is warm and dry.   Psychiatric: She has a normal mood and affect. Her behavior is normal.         Assessment/Plan   Ai was seen today for diabetes.    Diagnoses and all orders for this visit:    Type 2 diabetes mellitus without complication, without long-term current " use of insulin (CMS/[facility])  -     Comprehensive Metabolic Panel  -     Hemoglobin A1c  -     glucose blood test strip; Use as instructed    Mixed hyperlipidemia  -     atorvastatin (LIPITOR) 20 MG tablet; Take 1 tablet by mouth Daily.    De Quervain thyroiditis    Other orders  -     Cancel: Lipid Panel        Rest, ice and f/u if worse or no better. Declined seeing hand. Cont meds, f/u in 3 months.

## 2019-10-15 ENCOUNTER — HOSPITAL ENCOUNTER (OUTPATIENT)
Dept: DIABETES SERVICES | Facility: HOSPITAL | Age: 59
Setting detail: RECURRING SERIES
Discharge: HOME | End: 2019-10-15

## 2019-10-15 ENCOUNTER — APPOINTMENT (OUTPATIENT)
Dept: DIABETES SERVICES | Facility: HOSPITAL | Age: 59
End: 2019-10-15

## 2019-10-15 DIAGNOSIS — E83.52 HYPERCALCEMIA: Primary | ICD-10-CM

## 2019-10-15 LAB
ALBUMIN SERPL-MCNC: 4.6 G/DL (ref 3.5–5.2)
ALBUMIN/GLOB SERPL: 1.9 G/DL
ALP SERPL-CCNC: 119 U/L (ref 39–117)
ALT SERPL-CCNC: 31 U/L (ref 1–33)
AST SERPL-CCNC: 21 U/L (ref 1–32)
BILIRUB SERPL-MCNC: 0.3 MG/DL (ref 0.2–1.2)
BUN SERPL-MCNC: 13 MG/DL (ref 6–20)
BUN/CREAT SERPL: 16.5 (ref 7–25)
CALCIUM SERPL-MCNC: 11.4 MG/DL (ref 8.6–10.5)
CHLORIDE SERPL-SCNC: 104 MMOL/L (ref 98–107)
CO2 SERPL-SCNC: 27.4 MMOL/L (ref 22–29)
CREAT SERPL-MCNC: 0.79 MG/DL (ref 0.57–1)
GLOBULIN SER CALC-MCNC: 2.4 GM/DL
GLUCOSE SERPL-MCNC: 122 MG/DL (ref 65–99)
HBA1C MFR BLD: 9 % (ref 4.8–5.6)
POTASSIUM SERPL-SCNC: 4.6 MMOL/L (ref 3.5–5.2)
PROT SERPL-MCNC: 7 G/DL (ref 6–8.5)
SODIUM SERPL-SCNC: 143 MMOL/L (ref 136–145)

## 2019-10-15 PROCEDURE — G0109 DIAB MANAGE TRN IND/GROUP: HCPCS

## 2019-10-16 DIAGNOSIS — E83.52 HYPERCALCEMIA: Primary | ICD-10-CM

## 2019-10-16 LAB
25(OH)D3+25(OH)D2 SERPL-MCNC: 21.3 NG/ML (ref 30–100)
CALCIUM SERPL-MCNC: 10.4 MG/DL (ref 8.7–10.2)
INTACT PTH: ABNORMAL
PTH-INTACT SERPL-MCNC: 43 PG/ML (ref 15–65)

## 2019-10-20 ENCOUNTER — RESULTS ENCOUNTER (OUTPATIENT)
Dept: FAMILY MEDICINE CLINIC | Facility: CLINIC | Age: 59
End: 2019-10-20

## 2019-10-20 DIAGNOSIS — E83.52 HYPERCALCEMIA: ICD-10-CM

## 2019-10-22 ENCOUNTER — APPOINTMENT (OUTPATIENT)
Dept: DIABETES SERVICES | Facility: HOSPITAL | Age: 59
End: 2019-10-22

## 2019-10-30 ENCOUNTER — TELEPHONE (OUTPATIENT)
Dept: FAMILY MEDICINE CLINIC | Facility: CLINIC | Age: 59
End: 2019-10-30

## 2019-10-30 DIAGNOSIS — E11.9 TYPE 2 DIABETES MELLITUS WITHOUT COMPLICATION, WITHOUT LONG-TERM CURRENT USE OF INSULIN (HCC): ICD-10-CM

## 2019-10-30 NOTE — TELEPHONE ENCOUNTER
Patient changed her pharmacy and needs ne rx's sent to [facility] for her test strips and Metformin

## 2019-10-31 RX ORDER — METFORMIN HYDROCHLORIDE 500 MG/1
1000 TABLET, EXTENDED RELEASE ORAL 2 TIMES DAILY
Qty: 360 TABLET | Refills: 2 | Status: SHIPPED | OUTPATIENT
Start: 2019-10-31 | End: 2020-01-28 | Stop reason: SDUPTHER

## 2019-11-12 ENCOUNTER — TELEPHONE (OUTPATIENT)
Dept: FAMILY MEDICINE CLINIC | Facility: CLINIC | Age: 59
End: 2019-11-12

## 2019-11-12 NOTE — TELEPHONE ENCOUNTER
Pt had Vit D and PTH, intact and calcium done on 10-15-19. There is also a order dated 10/17/19 for Vit D, PTH, intact and calcium. Does patient need to come in for this or can this be cancelled?

## 2019-12-02 ENCOUNTER — HOSPITAL ENCOUNTER (OUTPATIENT)
Dept: CARDIOLOGY | Facility: HOSPITAL | Age: 59
Discharge: HOME | End: 2019-12-02
Admitting: OTOLARYNGOLOGY

## 2019-12-02 ENCOUNTER — TRANSCRIBE ORDERS (OUTPATIENT)
Dept: ADMINISTRATIVE | Facility: HOSPITAL | Age: 59
End: 2019-12-02

## 2019-12-02 DIAGNOSIS — E21.3 HYPERPARATHYROIDISM, UNSPECIFIED (HCC): ICD-10-CM

## 2019-12-02 DIAGNOSIS — E21.3 HYPERPARATHYROIDISM, UNSPECIFIED (HCC): Primary | ICD-10-CM

## 2019-12-02 PROCEDURE — 93010 ELECTROCARDIOGRAM REPORT: CPT | Performed by: INTERNAL MEDICINE

## 2019-12-02 PROCEDURE — 93005 ELECTROCARDIOGRAM TRACING: CPT | Performed by: OTOLARYNGOLOGY

## 2020-01-17 ENCOUNTER — OFFICE VISIT (OUTPATIENT)
Dept: FAMILY MEDICINE CLINIC | Facility: CLINIC | Age: 60
End: 2020-01-17

## 2020-01-17 VITALS
SYSTOLIC BLOOD PRESSURE: 108 MMHG | TEMPERATURE: 97.9 F | OXYGEN SATURATION: 98 % | BODY MASS INDEX: 27.88 KG/M2 | DIASTOLIC BLOOD PRESSURE: 66 MMHG | HEIGHT: 67 IN | HEART RATE: 83 BPM | WEIGHT: 177.6 LBS

## 2020-01-17 DIAGNOSIS — E21.3 HYPERPARATHYROIDISM (HCC): ICD-10-CM

## 2020-01-17 DIAGNOSIS — E11.9 TYPE 2 DIABETES MELLITUS WITHOUT COMPLICATION, WITHOUT LONG-TERM CURRENT USE OF INSULIN (HCC): ICD-10-CM

## 2020-01-17 DIAGNOSIS — S63.641D GAMEKEEPER'S THUMB OF RIGHT HAND, SUBSEQUENT ENCOUNTER: ICD-10-CM

## 2020-01-17 DIAGNOSIS — E78.2 MIXED HYPERLIPIDEMIA: Primary | ICD-10-CM

## 2020-01-17 PROBLEM — S63.641A GAMEKEEPER'S THUMB OF RIGHT HAND: Status: ACTIVE | Noted: 2020-01-17

## 2020-01-17 PROBLEM — E06.1 DE QUERVAIN THYROIDITIS: Status: RESOLVED | Noted: 2019-10-14 | Resolved: 2020-01-17

## 2020-01-17 PROCEDURE — 99214 OFFICE O/P EST MOD 30 MIN: CPT | Performed by: FAMILY MEDICINE

## 2020-01-17 NOTE — PROGRESS NOTES
Subjective   Ai Carreon is a 59 y.o. female.     Chief Complaint   Patient presents with   • Diabetes     Follow up        History of Present Illness   Diabetes-doing well on medication, fasting blood sugar 1 10-1 20 on average.  No side effects.  Has seen a nutritionist and working hard on diet.  Has lost 5 pounds since last appointment.    hyperlipidemia-started medication since her last visit.  Muscle aches.    Wrist pain- has tried rest and ice and since that time. It is worse.     High calcium-is seeing ENT.  Had a sestamibi scan. Has had surgery and having to have this again as they did not get it all.     The following portions of the patient's history were reviewed and updated as appropriate: allergies, current medications, past family history, past medical history, past social history, past surgical history and problem list.    Past Medical History:   Diagnosis Date   • Colon polyps     FOLLOWED BY DR. AMARI CASTRO   • Diabetes mellitus (CMS/HCC)     TYPE 2   • Hyperglycemia    • Hyperlipidemia     MIXED   • Kidney stone     SEEN IN PAST BY DR. HARDEEP VILLASENOR   • Pericarditis 1977   • Postmenopausal    • Right ovarian cyst 2002    S/P EXCISION       Past Surgical History:   Procedure Laterality Date   • COLONOSCOPY N/A 10/10/2019    5 MM TUBULAR ADENOMA POLYP IN SIGMOID, GRANULAR MUCOSA IN ILEOCECAL VALVE, BX: BENIGN, RESCOPE IN 5YRS, DR. AMARI CASTRO AT [facility]   • CYSTOSCOPY INSERTION / REMOVAL STENT / STONE N/A 10/31/2001    PLACEMENT OF LEFT DOUBLE J STENT, 6x24, DR. HARDEEP VILLASENOR AT [facility]   • LAPAROSCOPIC SALPINGOOPHERECTOMY Left 01/24/2002     AND EXCISION OF RIGHT OVARIAN CYST, DR. CHANDLER HAIR AT [facility]   • TUBAL ABDOMINAL LIGATION Bilateral 1990    POSTPARTUM   • URETEROSCOPY STENT INSERTION N/A 07/13/2009    WITH STONE EXTRACTION, DR. HARDEEP VILLASENOR AT [facility]       Family History   Problem Relation Age of Onset   • Melanoma Mother    • Cancer Mother    • Coronary artery disease Father    • Prostate cancer Father   "  • Diabetes Father    • Heart disease Father    • No Known Problems Sister    • Cancer Brother    • Melanoma Brother    • Prostate cancer Brother    • No Known Problems Daughter    • No Known Problems Son    • Ovarian cancer Maternal Grandmother    • Cancer Maternal Grandmother    • No Known Problems Maternal Aunt    • No Known Problems Paternal Aunt    • Ovarian cancer Paternal Grandmother    • Cancer Paternal Grandmother    • BRCA 1/2 Neg Hx    • Breast cancer Neg Hx    • Colon cancer Neg Hx    • Endometrial cancer Neg Hx    • Uterine cancer Neg Hx        Social History     Socioeconomic History   • Marital status:      Spouse name: Not on file   • Number of children: Not on file   • Years of education: Not on file   • Highest education level: Not on file   Tobacco Use   • Smoking status: Never Smoker   • Smokeless tobacco: Never Used   Substance and Sexual Activity   • Alcohol use: No   • Drug use: No   • Sexual activity: Defer     Birth control/protection: Post-menopausal, Surgical       Review of Systems   Respiratory: Negative for shortness of breath.    Cardiovascular: Negative for chest pain.       Objective   Visit Vitals  /66   Pulse 83   Temp 97.9 °F (36.6 °C) (Temporal)   Ht 170.2 cm (67\")   Wt 80.6 kg (177 lb 9.6 oz)   SpO2 98%   BMI 27.82 kg/m²     Body mass index is 27.82 kg/m².  Physical Exam   Constitutional: She is oriented to person, place, and time. She appears well-developed and well-nourished.   Cardiovascular: Normal rate, regular rhythm, normal heart sounds and intact distal pulses.   Pulmonary/Chest: Effort normal and breath sounds normal.   Musculoskeletal: Normal range of motion. She exhibits no edema.   Neurological: She is alert and oriented to person, place, and time.   Skin: Skin is warm and dry.   Psychiatric: She has a normal mood and affect. Her behavior is normal.         Assessment/Plan   Ai was seen today for diabetes.    Diagnoses and all orders for this " visit:    Mixed hyperlipidemia    Type 2 diabetes mellitus without complication, without long-term current use of insulin (CMS/[facility])  -     Comprehensive Metabolic Panel  -     Lipid Panel  -     Hemoglobin A1c  -     Microalbumin / Creatinine Urine Ratio - Urine, Clean Catch  -     glucose blood test strip; Use as instructed    Hyperparathyroidism (CMS/[facility])    Gamekeeper's thumb of right hand, subsequent encounter  -     Ambulatory Referral to Hand Surgery          Cont meds, f/u in 3-6 months. Stable.

## 2020-01-18 LAB
ALBUMIN SERPL-MCNC: 4.7 G/DL (ref 3.5–5.2)
ALBUMIN/CREAT UR: 7.2 MG/G CREAT (ref 0–30)
ALBUMIN/GLOB SERPL: 2.4 G/DL
ALP SERPL-CCNC: 146 U/L (ref 39–117)
ALT SERPL-CCNC: 20 U/L (ref 1–33)
AST SERPL-CCNC: 15 U/L (ref 1–32)
BILIRUB SERPL-MCNC: 0.3 MG/DL (ref 0.2–1.2)
BUN SERPL-MCNC: 14 MG/DL (ref 6–20)
BUN/CREAT SERPL: 19.4 (ref 7–25)
CALCIUM SERPL-MCNC: 11.2 MG/DL (ref 8.6–10.5)
CHLORIDE SERPL-SCNC: 101 MMOL/L (ref 98–107)
CHOLEST SERPL-MCNC: 150 MG/DL (ref 0–200)
CO2 SERPL-SCNC: 30.8 MMOL/L (ref 22–29)
CREAT SERPL-MCNC: 0.72 MG/DL (ref 0.57–1)
CREAT UR-MCNC: 66.7 MG/DL
GLOBULIN SER CALC-MCNC: 2 GM/DL
GLUCOSE SERPL-MCNC: 155 MG/DL (ref 65–99)
HBA1C MFR BLD: 6.3 % (ref 4.8–5.6)
HDLC SERPL-MCNC: 44 MG/DL (ref 40–60)
LDLC SERPL CALC-MCNC: 67 MG/DL (ref 0–100)
MICROALBUMIN UR-MCNC: 4.8 UG/ML
POTASSIUM SERPL-SCNC: 4.7 MMOL/L (ref 3.5–5.2)
PROT SERPL-MCNC: 6.7 G/DL (ref 6–8.5)
SODIUM SERPL-SCNC: 142 MMOL/L (ref 136–145)
TRIGL SERPL-MCNC: 195 MG/DL (ref 0–150)
VLDLC SERPL CALC-MCNC: 39 MG/DL

## 2020-01-28 DIAGNOSIS — E11.9 TYPE 2 DIABETES MELLITUS WITHOUT COMPLICATION, WITHOUT LONG-TERM CURRENT USE OF INSULIN (HCC): ICD-10-CM

## 2020-01-28 DIAGNOSIS — E78.2 MIXED HYPERLIPIDEMIA: ICD-10-CM

## 2020-01-28 RX ORDER — ATORVASTATIN CALCIUM 20 MG/1
20 TABLET, FILM COATED ORAL DAILY
Qty: 90 TABLET | Refills: 2 | Status: SHIPPED | OUTPATIENT
Start: 2020-01-28 | End: 2020-08-26

## 2020-01-28 RX ORDER — METFORMIN HYDROCHLORIDE 500 MG/1
1000 TABLET, EXTENDED RELEASE ORAL 2 TIMES DAILY
Qty: 360 TABLET | Refills: 2 | Status: SHIPPED | OUTPATIENT
Start: 2020-01-28 | End: 2020-09-22 | Stop reason: SDUPTHER

## 2020-02-27 ENCOUNTER — TRANSCRIBE ORDERS (OUTPATIENT)
Dept: ADMINISTRATIVE | Facility: HOSPITAL | Age: 60
End: 2020-02-27

## 2020-02-27 DIAGNOSIS — E05.90 HYPERTHYROIDISM: Primary | ICD-10-CM

## 2020-02-29 ENCOUNTER — HOSPITAL ENCOUNTER (EMERGENCY)
Facility: HOSPITAL | Age: 60
Discharge: HOME | End: 2020-02-29
Attending: EMERGENCY MEDICINE | Admitting: EMERGENCY MEDICINE

## 2020-02-29 ENCOUNTER — APPOINTMENT (OUTPATIENT)
Dept: ULTRASOUND IMAGING | Facility: HOSPITAL | Age: 60
End: 2020-02-29

## 2020-02-29 VITALS
WEIGHT: 170 LBS | DIASTOLIC BLOOD PRESSURE: 62 MMHG | BODY MASS INDEX: 26.68 KG/M2 | OXYGEN SATURATION: 97 % | RESPIRATION RATE: 20 BRPM | HEIGHT: 67 IN | TEMPERATURE: 96.8 F | SYSTOLIC BLOOD PRESSURE: 109 MMHG | HEART RATE: 65 BPM

## 2020-02-29 DIAGNOSIS — K80.20 SYMPTOMATIC CHOLELITHIASIS: Primary | ICD-10-CM

## 2020-02-29 LAB
ALBUMIN SERPL-MCNC: 4.4 G/DL (ref 3.5–5.2)
ALBUMIN/GLOB SERPL: 1.9 G/DL
ALP SERPL-CCNC: 157 U/L (ref 39–117)
ALT SERPL W P-5'-P-CCNC: 41 U/L (ref 1–33)
ANION GAP SERPL CALCULATED.3IONS-SCNC: 14.1 MMOL/L (ref 5–15)
AST SERPL-CCNC: 67 U/L (ref 1–32)
BACTERIA UR QL AUTO: ABNORMAL /HPF
BASOPHILS # BLD AUTO: 0.04 10*3/MM3 (ref 0–0.2)
BASOPHILS NFR BLD AUTO: 0.5 % (ref 0–1.5)
BILIRUB SERPL-MCNC: 0.5 MG/DL (ref 0.2–1.2)
BILIRUB UR QL STRIP: NEGATIVE
BUN BLD-MCNC: 9 MG/DL (ref 6–20)
BUN/CREAT SERPL: 12.7 (ref 7–25)
CALCIUM SPEC-SCNC: 10.2 MG/DL (ref 8.6–10.5)
CHLORIDE SERPL-SCNC: 104 MMOL/L (ref 98–107)
CLARITY UR: CLEAR
CO2 SERPL-SCNC: 24.9 MMOL/L (ref 22–29)
COD CRY URNS QL: ABNORMAL /HPF
COLOR UR: YELLOW
CREAT BLD-MCNC: 0.71 MG/DL (ref 0.57–1)
DEPRECATED RDW RBC AUTO: 40.2 FL (ref 37–54)
EOSINOPHIL # BLD AUTO: 0.1 10*3/MM3 (ref 0–0.4)
EOSINOPHIL NFR BLD AUTO: 1.4 % (ref 0.3–6.2)
ERYTHROCYTE [DISTWIDTH] IN BLOOD BY AUTOMATED COUNT: 12.4 % (ref 12.3–15.4)
GFR SERPL CREATININE-BSD FRML MDRD: 84 ML/MIN/1.73
GLOBULIN UR ELPH-MCNC: 2.3 GM/DL
GLUCOSE BLD-MCNC: 134 MG/DL (ref 65–99)
GLUCOSE UR STRIP-MCNC: NEGATIVE MG/DL
HCT VFR BLD AUTO: 40.2 % (ref 34–46.6)
HGB BLD-MCNC: 13.6 G/DL (ref 12–15.9)
HGB UR QL STRIP.AUTO: NEGATIVE
HYALINE CASTS UR QL AUTO: ABNORMAL /LPF
IMM GRANULOCYTES # BLD AUTO: 0.03 10*3/MM3 (ref 0–0.05)
IMM GRANULOCYTES NFR BLD AUTO: 0.4 % (ref 0–0.5)
KETONES UR QL STRIP: NEGATIVE
LEUKOCYTE ESTERASE UR QL STRIP.AUTO: ABNORMAL
LIPASE SERPL-CCNC: 15 U/L (ref 13–60)
LYMPHOCYTES # BLD AUTO: 1.85 10*3/MM3 (ref 0.7–3.1)
LYMPHOCYTES NFR BLD AUTO: 25.3 % (ref 19.6–45.3)
MCH RBC QN AUTO: 30.3 PG (ref 26.6–33)
MCHC RBC AUTO-ENTMCNC: 33.8 G/DL (ref 31.5–35.7)
MCV RBC AUTO: 89.5 FL (ref 79–97)
MONOCYTES # BLD AUTO: 0.37 10*3/MM3 (ref 0.1–0.9)
MONOCYTES NFR BLD AUTO: 5.1 % (ref 5–12)
NEUTROPHILS # BLD AUTO: 4.93 10*3/MM3 (ref 1.7–7)
NEUTROPHILS NFR BLD AUTO: 67.3 % (ref 42.7–76)
NITRITE UR QL STRIP: NEGATIVE
NRBC BLD AUTO-RTO: 0 /100 WBC (ref 0–0.2)
PH UR STRIP.AUTO: <=5 [PH] (ref 5–8)
PLATELET # BLD AUTO: 256 10*3/MM3 (ref 140–450)
PMV BLD AUTO: 9.8 FL (ref 6–12)
POTASSIUM BLD-SCNC: 4.1 MMOL/L (ref 3.5–5.2)
PROT SERPL-MCNC: 6.7 G/DL (ref 6–8.5)
PROT UR QL STRIP: NEGATIVE
RBC # BLD AUTO: 4.49 10*6/MM3 (ref 3.77–5.28)
RBC # UR: ABNORMAL /HPF
REF LAB TEST METHOD: ABNORMAL
SODIUM BLD-SCNC: 143 MMOL/L (ref 136–145)
SP GR UR STRIP: 1.02 (ref 1–1.03)
SQUAMOUS #/AREA URNS HPF: ABNORMAL /HPF
TROPONIN T SERPL-MCNC: <0.01 NG/ML (ref 0–0.03)
UROBILINOGEN UR QL STRIP: ABNORMAL
WBC NRBC COR # BLD: 7.32 10*3/MM3 (ref 3.4–10.8)
WBC UR QL AUTO: ABNORMAL /HPF

## 2020-02-29 PROCEDURE — 25010000002 ONDANSETRON PER 1 MG: Performed by: EMERGENCY MEDICINE

## 2020-02-29 PROCEDURE — 96374 THER/PROPH/DIAG INJ IV PUSH: CPT

## 2020-02-29 PROCEDURE — 80053 COMPREHEN METABOLIC PANEL: CPT | Performed by: EMERGENCY MEDICINE

## 2020-02-29 PROCEDURE — 93005 ELECTROCARDIOGRAM TRACING: CPT

## 2020-02-29 PROCEDURE — 83690 ASSAY OF LIPASE: CPT | Performed by: EMERGENCY MEDICINE

## 2020-02-29 PROCEDURE — 93010 ELECTROCARDIOGRAM REPORT: CPT | Performed by: INTERNAL MEDICINE

## 2020-02-29 PROCEDURE — 25010000002 MORPHINE PER 10 MG: Performed by: EMERGENCY MEDICINE

## 2020-02-29 PROCEDURE — 96375 TX/PRO/DX INJ NEW DRUG ADDON: CPT

## 2020-02-29 PROCEDURE — 81001 URINALYSIS AUTO W/SCOPE: CPT | Performed by: EMERGENCY MEDICINE

## 2020-02-29 PROCEDURE — 93005 ELECTROCARDIOGRAM TRACING: CPT | Performed by: EMERGENCY MEDICINE

## 2020-02-29 PROCEDURE — 85025 COMPLETE CBC W/AUTO DIFF WBC: CPT | Performed by: EMERGENCY MEDICINE

## 2020-02-29 PROCEDURE — 99284 EMERGENCY DEPT VISIT MOD MDM: CPT

## 2020-02-29 PROCEDURE — 84484 ASSAY OF TROPONIN QUANT: CPT | Performed by: EMERGENCY MEDICINE

## 2020-02-29 PROCEDURE — 76705 ECHO EXAM OF ABDOMEN: CPT

## 2020-02-29 RX ORDER — SODIUM CHLORIDE 0.9 % (FLUSH) 0.9 %
10 SYRINGE (ML) INJECTION AS NEEDED
Status: DISCONTINUED | OUTPATIENT
Start: 2020-02-29 | End: 2020-02-29 | Stop reason: HOSPADM

## 2020-02-29 RX ORDER — ONDANSETRON 2 MG/ML
4 INJECTION INTRAMUSCULAR; INTRAVENOUS ONCE
Status: COMPLETED | OUTPATIENT
Start: 2020-02-29 | End: 2020-02-29

## 2020-02-29 RX ORDER — OXYCODONE HYDROCHLORIDE AND ACETAMINOPHEN 5; 325 MG/1; MG/1
1-2 TABLET ORAL EVERY 6 HOURS PRN
Qty: 12 TABLET | Refills: 0 | Status: SHIPPED | OUTPATIENT
Start: 2020-02-29 | End: 2020-08-26

## 2020-02-29 RX ORDER — MORPHINE SULFATE 2 MG/ML
4 INJECTION, SOLUTION INTRAMUSCULAR; INTRAVENOUS ONCE
Status: COMPLETED | OUTPATIENT
Start: 2020-02-29 | End: 2020-02-29

## 2020-02-29 RX ADMIN — ONDANSETRON 4 MG: 2 INJECTION INTRAMUSCULAR; INTRAVENOUS at 15:10

## 2020-02-29 RX ADMIN — MORPHINE SULFATE 4 MG: 2 INJECTION, SOLUTION INTRAMUSCULAR; INTRAVENOUS at 15:11

## 2020-03-02 ENCOUNTER — HOSPITAL ENCOUNTER (OUTPATIENT)
Dept: BONE DENSITY | Facility: HOSPITAL | Age: 60
Discharge: HOME | End: 2020-03-02
Admitting: OTOLARYNGOLOGY

## 2020-03-02 DIAGNOSIS — E05.90 HYPERTHYROIDISM: ICD-10-CM

## 2020-03-02 PROCEDURE — 77080 DXA BONE DENSITY AXIAL: CPT

## 2020-08-26 ENCOUNTER — APPOINTMENT (OUTPATIENT)
Dept: WOMENS IMAGING | Facility: HOSPITAL | Age: 60
End: 2020-08-26

## 2020-08-26 ENCOUNTER — OFFICE VISIT (OUTPATIENT)
Dept: OBSTETRICS AND GYNECOLOGY | Age: 60
End: 2020-08-26

## 2020-08-26 ENCOUNTER — PROCEDURE VISIT (OUTPATIENT)
Dept: OBSTETRICS AND GYNECOLOGY | Age: 60
End: 2020-08-26

## 2020-08-26 VITALS
WEIGHT: 179 LBS | BODY MASS INDEX: 28.09 KG/M2 | HEIGHT: 67 IN | DIASTOLIC BLOOD PRESSURE: 62 MMHG | SYSTOLIC BLOOD PRESSURE: 130 MMHG

## 2020-08-26 DIAGNOSIS — Z11.51 ENCOUNTER FOR SCREENING FOR HUMAN PAPILLOMAVIRUS (HPV): ICD-10-CM

## 2020-08-26 DIAGNOSIS — Z01.419 WELL WOMAN EXAM WITH ROUTINE GYNECOLOGICAL EXAM: Primary | ICD-10-CM

## 2020-08-26 DIAGNOSIS — Z12.31 VISIT FOR SCREENING MAMMOGRAM: Primary | ICD-10-CM

## 2020-08-26 PROCEDURE — 99396 PREV VISIT EST AGE 40-64: CPT | Performed by: OBSTETRICS & GYNECOLOGY

## 2020-08-26 PROCEDURE — 77067 SCR MAMMO BI INCL CAD: CPT | Performed by: RADIOLOGY

## 2020-08-26 PROCEDURE — 77067 SCR MAMMO BI INCL CAD: CPT | Performed by: OBSTETRICS & GYNECOLOGY

## 2020-08-26 NOTE — PROGRESS NOTES
"Chief complaint: annual    Subjective   History of Present Illness    Ai Carreon is a 59 y.o.  who presents for annual exam. No gyn c/o. Has hot flashes but declines hormones, also declines non-hormonal options.  Her menses are absent, no PMB  MMG today  Pap due today  10/2019 colonoscopy +polyp, plans q 5 hrs  Has primary MD, DM better controlled  Pt's mother is BRCA gene negative (MGM with ovarian cancer)  Pt had parathyroid removal this year    Obstetric History:  OB History        3    Para   3    Term   3            AB        Living   3       SAB        TAB        Ectopic        Molar        Multiple        Live Births   3               Menstrual History:     No LMP recorded. Patient is postmenopausal.         Current contraception: post menopausal status and tubal ligation  History of abnormal Pap smear: no  Received Gardasil immunization: no  Perform regular self breast exam: yes -    Family history of uterine or ovarian cancer: yes - MGM w/ ovarian cancer in 80s  Family History of colon cancer: no  Family history of breast cancer: no    Mammogram: done today.  Colonoscopy: up to date.  DEXA: not indicated.    Exercise: moderately active  Calcium/Vitamin D: adequate intake    The following portions of the patient's history were reviewed and updated as appropriate: allergies, current medications, past family history, past medical history, past social history, past surgical history and problem list.    Review of Systems   Constitutional: Negative.    Respiratory: Negative.    Cardiovascular: Negative.    Gastrointestinal: Negative.    Endocrine: Positive for heat intolerance.   Genitourinary: Negative.        Pertinent items are noted in HPI.     Objective   Physical Exam    /62   Ht 170.2 cm (67\")   Wt 81.2 kg (179 lb)   Breastfeeding No   BMI 28.04 kg/m²     General:   alert, appears stated age and cooperative   Neck: no asymmetry, masses, or scars   Heart: regular rate and " rhythm, S1, S2 normal, no murmur, click, rub or gallop   Lungs: clear to auscultation bilaterally   Abdomen: soft, non-tender, without masses or organomegaly   Breast: inspection negative, no nipple discharge or bleeding, no masses or nodularity palpable   Vulva: normal, Bartholin's, Urethra, [place]'s normal   Vagina: normal mucosa   Cervix: no bleeding following Pap, no cervical motion tenderness and no lesions   Uterus: normal size, anteverted, mobile, non-tender, normal shape and consistency   Adnexa: normal adnexa and no mass, fullness, tenderness   Rectal: not indicated     Assessment/Plan   Ai was seen today for gynecologic exam.    Diagnoses and all orders for this visit:    Well woman exam with routine gynecological exam  -     PapIG, HPV, Rfx 16 / 18    Encounter for screening for human papillomavirus (HPV)  -     PapIG, HPV, Rfx 16 / 18    discussed calcium and vitamin D for bone health  May need to do DEXA next year    Breast self exam technique reviewed and patient encouraged to perform self-exam monthly.  Discussed healthy lifestyle modifications.  Pap smear sent

## 2020-08-30 LAB
CYTOLOGIST CVX/VAG CYTO: NORMAL
CYTOLOGY CVX/VAG DOC CYTO: NORMAL
CYTOLOGY CVX/VAG DOC THIN PREP: NORMAL
DX ICD CODE: NORMAL
HIV 1 & 2 AB SER-IMP: NORMAL
HPV I/H RISK 1 DNA CVX QL PROBE+SIG AMP: NEGATIVE
OTHER STN SPEC: NORMAL
STAT OF ADQ CVX/VAG CYTO-IMP: NORMAL

## 2020-08-31 ENCOUNTER — TELEPHONE (OUTPATIENT)
Dept: OBSTETRICS AND GYNECOLOGY | Age: 60
End: 2020-08-31

## 2020-09-17 DIAGNOSIS — E11.9 TYPE 2 DIABETES MELLITUS WITHOUT COMPLICATION, WITHOUT LONG-TERM CURRENT USE OF INSULIN (HCC): ICD-10-CM

## 2020-09-18 RX ORDER — METFORMIN HYDROCHLORIDE 500 MG/1
TABLET, EXTENDED RELEASE ORAL
Qty: 360 TABLET | Refills: 3 | OUTPATIENT
Start: 2020-09-18

## 2020-09-22 ENCOUNTER — OFFICE VISIT (OUTPATIENT)
Dept: FAMILY MEDICINE CLINIC | Facility: CLINIC | Age: 60
End: 2020-09-22

## 2020-09-22 VITALS
DIASTOLIC BLOOD PRESSURE: 75 MMHG | TEMPERATURE: 97.1 F | HEIGHT: 67 IN | BODY MASS INDEX: 29.03 KG/M2 | WEIGHT: 185 LBS | OXYGEN SATURATION: 96 % | SYSTOLIC BLOOD PRESSURE: 124 MMHG | HEART RATE: 82 BPM

## 2020-09-22 DIAGNOSIS — E21.3 HYPERPARATHYROIDISM (HCC): ICD-10-CM

## 2020-09-22 DIAGNOSIS — Z11.59 ENCOUNTER FOR HEPATITIS C SCREENING TEST FOR LOW RISK PATIENT: Primary | ICD-10-CM

## 2020-09-22 DIAGNOSIS — E78.2 MIXED HYPERLIPIDEMIA: ICD-10-CM

## 2020-09-22 DIAGNOSIS — E11.9 TYPE 2 DIABETES MELLITUS WITHOUT COMPLICATION, WITHOUT LONG-TERM CURRENT USE OF INSULIN (HCC): ICD-10-CM

## 2020-09-22 PROCEDURE — 90750 HZV VACC RECOMBINANT IM: CPT | Performed by: FAMILY MEDICINE

## 2020-09-22 PROCEDURE — 90471 IMMUNIZATION ADMIN: CPT | Performed by: FAMILY MEDICINE

## 2020-09-22 PROCEDURE — 90472 IMMUNIZATION ADMIN EACH ADD: CPT | Performed by: FAMILY MEDICINE

## 2020-09-22 PROCEDURE — 90686 IIV4 VACC NO PRSV 0.5 ML IM: CPT | Performed by: FAMILY MEDICINE

## 2020-09-22 PROCEDURE — 99214 OFFICE O/P EST MOD 30 MIN: CPT | Performed by: FAMILY MEDICINE

## 2020-09-22 RX ORDER — CHOLECALCIFEROL (VITAMIN D3) 25 MCG
1000 CAPSULE ORAL 2 TIMES DAILY
COMMUNITY
Start: 2020-07-28 | End: 2022-06-13

## 2020-09-22 RX ORDER — METFORMIN HYDROCHLORIDE 500 MG/1
1000 TABLET, EXTENDED RELEASE ORAL 2 TIMES DAILY
Qty: 360 TABLET | Refills: 2 | Status: SHIPPED | OUTPATIENT
Start: 2020-09-22 | End: 2021-03-23 | Stop reason: SDUPTHER

## 2020-09-22 NOTE — PROGRESS NOTES
Subjective   Ai Carreon is a 60 y.o. female.     Chief Complaint   Patient presents with   • Diabetes     HPI   Diabetes-doing well on medication, fasting blood sugar 110-120 on average.  No side effects.  Has seen a nutritionist and working hard on diet.    hyperlipidemia- stopped meds due to muscle aches. Taking fish oil. Atorvastatin caused muscle aches.     High calcium-is seeing ENT.  Had a sestamibi scan. Has had surgery twice and doing well. Has follow up planned.     The following portions of the patient's history were reviewed and updated as appropriate: allergies, current medications, past family history, past medical history, past social history, past surgical history and problem list.    Past Medical History:   Diagnosis Date   • Colon polyps     FOLLOWED BY DR. AMARI CASTRO   • Diabetes mellitus (CMS/HCC)     TYPE 2   • Hyperglycemia    • Hyperlipidemia     MIXED   • Kidney stone     SEEN IN PAST BY DR. HARDEEP VILLASENOR   • Pericarditis 1977   • Postmenopausal    • Right ovarian cyst 2002    S/P EXCISION       Past Surgical History:   Procedure Laterality Date   • COLONOSCOPY N/A 10/10/2019    5 MM TUBULAR ADENOMA POLYP IN SIGMOID, GRANULAR MUCOSA IN ILEOCECAL VALVE, BX: BENIGN, RESCOPE IN 5YRS, DR. AMARI CASTRO AT [facility]   • CYSTOSCOPY INSERTION / REMOVAL STENT / STONE N/A 10/31/2001    PLACEMENT OF LEFT DOUBLE J STENT, 6x24, DR. HARDEEP VILLASENOR AT [facility]   • LAPAROSCOPIC SALPINGOOPHERECTOMY Left 01/24/2002     AND EXCISION OF RIGHT OVARIAN CYST, DR. CHANDLER HAIR AT [facility]   • PARATHYROIDECTOMY  05/2020   • PELVIC LAPAROSCOPY      [facility]   • TUBAL ABDOMINAL LIGATION Bilateral 1990    POSTPARTUM   • URETEROSCOPY STENT INSERTION N/A 07/13/2009    WITH STONE EXTRACTION, DR. HARDEEP VILLASENOR AT [facility]       Family History   Problem Relation Age of Onset   • Melanoma Mother    • Cancer Mother    • Coronary artery disease Father    • Prostate cancer Father    • Diabetes Father    • Heart disease Father    • No Known Problems Sister   "  • Cancer Brother    • Melanoma Brother    • Prostate cancer Brother    • No Known Problems Daughter    • No Known Problems Son    • Ovarian cancer Maternal Grandmother    • Cancer Maternal Grandmother    • No Known Problems Maternal Aunt    • No Known Problems Paternal Aunt    • Cancer Paternal Grandmother    • BRCA 1/2 Neg Hx    • Breast cancer Neg Hx    • Colon cancer Neg Hx    • Endometrial cancer Neg Hx    • Uterine cancer Neg Hx        Social History     Socioeconomic History   • Marital status:      Spouse name: Not on file   • Number of children: Not on file   • Years of education: Not on file   • Highest education level: Not on file   Tobacco Use   • Smoking status: Never Smoker   • Smokeless tobacco: Never Used   Substance and Sexual Activity   • Alcohol use: No   • Drug use: No   • Sexual activity: Yes     Partners: Male     Birth control/protection: Post-menopausal, Surgical     Comment: Tubal ligation       Review of Systems   Respiratory: Negative for shortness of breath.    Cardiovascular: Negative for chest pain.       Objective   Visit Vitals  /75 (BP Location: Right arm, Patient Position: Sitting)   Pulse 82   Temp 97.1 °F (36.2 °C)   Ht 170.2 cm (67.01\")   Wt 83.9 kg (185 lb)   SpO2 96%   BMI 28.97 kg/m²     Body mass index is 28.97 kg/m².  Physical Exam   Constitutional: She is oriented to person, place, and time. She appears well-developed and well-nourished.   Cardiovascular: Normal rate, regular rhythm and normal heart sounds.   Pulmonary/Chest: Effort normal and breath sounds normal.   Abdominal: Normal appearance.   Musculoskeletal: Normal range of motion.   Neurological: She is alert and oriented to person, place, and time.   Skin: Skin is warm and dry.   Psychiatric: Her behavior is normal. Mood normal.         Assessment/Plan   Ai was seen today for diabetes.    Diagnoses and all orders for this visit:    Encounter for hepatitis C screening test for low risk patient  -   "   Hepatitis C Antibody    Mixed hyperlipidemia    Type 2 diabetes mellitus without complication, without long-term current use of insulin (CMS/[facility])  -     Comprehensive Metabolic Panel  -     Lipid Panel  -     Hemoglobin A1c  -     Microalbumin / Creatinine Urine Ratio - Urine, Clean Catch  -     metFORMIN ER (Glucophage XR) 500 MG 24 hr tablet; Take 2 tablets by mouth 2 (Two) Times a Day.    Hyperparathyroidism (CMS/[facility])    Other orders  -     Cancel: Tdap Vaccine Greater Than or Equal To 8yo IM  -     Shingrix Vaccine  -     Fluarix/Fluzone/Afluria Quad>6 Months          Cont meds, f/u in 3-6 months. Stable.

## 2020-09-23 LAB
ALBUMIN SERPL-MCNC: 4.7 G/DL (ref 3.5–5.2)
ALBUMIN/CREAT UR: 11 MG/G CREAT (ref 0–29)
ALBUMIN/GLOB SERPL: 2.8 G/DL
ALP SERPL-CCNC: 98 U/L (ref 39–117)
ALT SERPL-CCNC: 23 U/L (ref 1–33)
AST SERPL-CCNC: 15 U/L (ref 1–32)
BILIRUB SERPL-MCNC: 0.2 MG/DL (ref 0–1.2)
BUN SERPL-MCNC: 14 MG/DL (ref 8–23)
BUN/CREAT SERPL: 21.9 (ref 7–25)
CALCIUM SERPL-MCNC: 9.4 MG/DL (ref 8.6–10.5)
CHLORIDE SERPL-SCNC: 103 MMOL/L (ref 98–107)
CHOLEST SERPL-MCNC: 199 MG/DL (ref 0–200)
CO2 SERPL-SCNC: 26.8 MMOL/L (ref 22–29)
CREAT SERPL-MCNC: 0.64 MG/DL (ref 0.57–1)
CREAT UR-MCNC: 52.7 MG/DL
GLOBULIN SER CALC-MCNC: 1.7 GM/DL
GLUCOSE SERPL-MCNC: 82 MG/DL (ref 65–99)
HBA1C MFR BLD: 6.1 % (ref 4.8–5.6)
HCV AB S/CO SERPL IA: <0.1 S/CO RATIO (ref 0–0.9)
HDLC SERPL-MCNC: 43 MG/DL (ref 40–60)
LDLC SERPL CALC-MCNC: 104 MG/DL (ref 0–100)
MICROALBUMIN UR-MCNC: 5.6 UG/ML
POTASSIUM SERPL-SCNC: 4.2 MMOL/L (ref 3.5–5.2)
PROT SERPL-MCNC: 6.4 G/DL (ref 6–8.5)
SODIUM SERPL-SCNC: 141 MMOL/L (ref 136–145)
TRIGL SERPL-MCNC: 258 MG/DL (ref 0–150)
VLDLC SERPL CALC-MCNC: 51.6 MG/DL

## 2021-02-17 ENCOUNTER — CLINICAL SUPPORT (OUTPATIENT)
Dept: FAMILY MEDICINE CLINIC | Facility: CLINIC | Age: 61
End: 2021-02-17

## 2021-02-17 DIAGNOSIS — Z23 NEED FOR SHINGLES VACCINE: Primary | ICD-10-CM

## 2021-02-17 PROCEDURE — 90750 HZV VACC RECOMBINANT IM: CPT | Performed by: FAMILY MEDICINE

## 2021-03-02 NOTE — TELEPHONE ENCOUNTER
----- Message from Debbie Booker MD sent at 7/26/2017  2:49 PM EDT -----  Pap normal, HPV-  
lmtc Pap normal, HPV-  
not'd pt  
You can access the FollowMyHealth Patient Portal offered by [facility] by registering at the following website: [email or web address]. By joining [employer]’s FollowMyHealth portal, you will also be able to view your health information using other applications (apps) compatible with our system.

## 2021-03-23 ENCOUNTER — OFFICE VISIT (OUTPATIENT)
Dept: FAMILY MEDICINE CLINIC | Facility: CLINIC | Age: 61
End: 2021-03-23

## 2021-03-23 VITALS
SYSTOLIC BLOOD PRESSURE: 142 MMHG | WEIGHT: 191.4 LBS | BODY MASS INDEX: 30.04 KG/M2 | HEART RATE: 62 BPM | OXYGEN SATURATION: 96 % | HEIGHT: 67 IN | TEMPERATURE: 97.7 F | DIASTOLIC BLOOD PRESSURE: 92 MMHG

## 2021-03-23 DIAGNOSIS — E78.2 MIXED HYPERLIPIDEMIA: Primary | ICD-10-CM

## 2021-03-23 DIAGNOSIS — E11.9 TYPE 2 DIABETES MELLITUS WITHOUT COMPLICATION, WITHOUT LONG-TERM CURRENT USE OF INSULIN (HCC): ICD-10-CM

## 2021-03-23 DIAGNOSIS — E21.3 HYPERPARATHYROIDISM (HCC): ICD-10-CM

## 2021-03-23 PROBLEM — R73.9 HYPERGLYCEMIA: Status: RESOLVED | Noted: 2019-09-11 | Resolved: 2021-03-23

## 2021-03-23 PROCEDURE — 99214 OFFICE O/P EST MOD 30 MIN: CPT | Performed by: FAMILY MEDICINE

## 2021-03-23 RX ORDER — METFORMIN HYDROCHLORIDE 500 MG/1
1000 TABLET, EXTENDED RELEASE ORAL 2 TIMES DAILY
Qty: 360 TABLET | Refills: 2 | Status: SHIPPED | OUTPATIENT
Start: 2021-03-23 | End: 2021-10-24

## 2021-03-23 NOTE — PROGRESS NOTES
Subjective   Ai Carreon is a 60 y.o. female.     Chief Complaint   Patient presents with   • Diabetes     HPI   Diabetes-doing well on medication, fasting blood sugar 110-120 on average.  No side effects.  Has seen a nutritionist and working hard on diet. Has slipped a little in the past few months.     hyperlipidemia- stopped meds due to muscle aches. Taking fish oil. Atorvastatin caused muscle aches.     High calcium-is seeing ENT.  Had a sestamibi scan. Has had surgery twice and doing well. Has follow up planned. Has increased her D3 and went down on calcium and has f/u in may.     The following portions of the patient's history were reviewed and updated as appropriate: allergies, current medications, past family history, past medical history, past social history, past surgical history and problem list.    Past Medical History:   Diagnosis Date   • Colon polyps     FOLLOWED BY DR. AMARI CASTRO   • Diabetes mellitus (CMS/HCC)     TYPE 2   • Hyperglycemia    • Hyperlipidemia     MIXED   • Kidney stone     SEEN IN PAST BY DR. HARDEEP VILLASENOR   • Pericarditis 1977   • Postmenopausal    • Right ovarian cyst 2002    S/P EXCISION       Past Surgical History:   Procedure Laterality Date   • COLONOSCOPY N/A 10/10/2019    5 MM TUBULAR ADENOMA POLYP IN SIGMOID, GRANULAR MUCOSA IN ILEOCECAL VALVE, BX: BENIGN, RESCOPE IN 5YRS, DR. AMARI CASTRO AT [facility]   • CYSTOSCOPY INSERTION / REMOVAL STENT / STONE N/A 10/31/2001    PLACEMENT OF LEFT DOUBLE J STENT, 6x24, DR. HARDEEP VILLASENOR AT [facility]   • LAPAROSCOPIC SALPINGOOPHERECTOMY Left 01/24/2002     AND EXCISION OF RIGHT OVARIAN CYST, DR. CHANDLER HAIR AT [facility]   • PARATHYROIDECTOMY  05/2020   • PELVIC LAPAROSCOPY      [facility]   • TUBAL ABDOMINAL LIGATION Bilateral 1990    POSTPARTUM   • URETEROSCOPY STENT INSERTION N/A 07/13/2009    WITH STONE EXTRACTION, DR. HARDEEP VILLASENOR AT [facility]       Family History   Problem Relation Age of Onset   • Melanoma Mother    • Cancer Mother    • Coronary artery disease  "Father    • Prostate cancer Father    • Diabetes Father    • Heart disease Father    • No Known Problems Sister    • Cancer Brother    • Melanoma Brother    • Prostate cancer Brother    • No Known Problems Daughter    • No Known Problems Son    • Ovarian cancer Maternal Grandmother    • Cancer Maternal Grandmother    • No Known Problems Maternal Aunt    • No Known Problems Paternal Aunt    • Cancer Paternal Grandmother    • BRCA 1/2 Neg Hx    • Breast cancer Neg Hx    • Colon cancer Neg Hx    • Endometrial cancer Neg Hx    • Uterine cancer Neg Hx        Social History     Socioeconomic History   • Marital status:      Spouse name: Not on file   • Number of children: Not on file   • Years of education: Not on file   • Highest education level: Not on file   Tobacco Use   • Smoking status: Never Smoker   • Smokeless tobacco: Never Used   Substance and Sexual Activity   • Alcohol use: No   • Drug use: No   • Sexual activity: Yes     Partners: Male     Birth control/protection: Post-menopausal, Surgical     Comment: Tubal ligation       Review of Systems   Constitutional: Negative for fatigue and unexpected weight loss.   Eyes: Negative for blurred vision.   Respiratory: Negative for shortness of breath.    Cardiovascular: Negative for chest pain.   Endocrine: Negative for polydipsia, polyphagia and polyuria.   Skin: Negative for pallor.   Neurological: Negative for dizziness, tremors, seizures, speech difficulty, weakness and confusion.   Psychiatric/Behavioral: The patient is not nervous/anxious.        Objective   Visit Vitals  /92 (BP Location: Left arm, Patient Position: Sitting)   Pulse 62   Temp 97.7 °F (36.5 °C)   Ht 170.2 cm (67.01\")   Wt 86.8 kg (191 lb 6.4 oz)   SpO2 96%   BMI 29.97 kg/m²     Body mass index is 29.97 kg/m².  Physical Exam   Constitutional: She is oriented to person, place, and time. She appears well-developed and well-nourished.   Cardiovascular: Normal rate, regular rhythm and " normal heart sounds.   Pulmonary/Chest: Effort normal and breath sounds normal.   Abdominal: Normal appearance.   Musculoskeletal: Normal range of motion.   Neurological: She is alert and oriented to person, place, and time.   Skin: Skin is warm and dry.   Psychiatric: Her behavior is normal. Mood normal.         Assessment/Plan   Diagnoses and all orders for this visit:    1. Mixed hyperlipidemia (Primary)    2. Hyperparathyroidism (CMS/[facility])    3. Type 2 diabetes mellitus without complication, without long-term current use of insulin (CMS/[facility])  -     Comprehensive Metabolic Panel  -     Lipid Panel  -     Hemoglobin A1c  -     Microalbumin / Creatinine Urine Ratio - Urine, Clean Catch  -     metFORMIN ER (Glucophage XR) 500 MG 24 hr tablet; Take 2 tablets by mouth 2 (Two) Times a Day.  Dispense: 360 tablet; Refill: 2          Cont meds, f/u in 3-6 months. Stable.     If cholesterol high, will try pravastatin. Pt wants a call with results.

## 2021-03-24 DIAGNOSIS — E78.2 MIXED HYPERLIPIDEMIA: Primary | ICD-10-CM

## 2021-03-24 LAB
ALBUMIN SERPL-MCNC: 4.5 G/DL (ref 3.5–5.2)
ALBUMIN/CREAT UR: 11 MG/G CREAT (ref 0–29)
ALBUMIN/GLOB SERPL: 2.1 G/DL
ALP SERPL-CCNC: 99 U/L (ref 39–117)
ALT SERPL-CCNC: 31 U/L (ref 1–33)
AST SERPL-CCNC: 21 U/L (ref 1–32)
BILIRUB SERPL-MCNC: 0.4 MG/DL (ref 0–1.2)
BUN SERPL-MCNC: 10 MG/DL (ref 8–23)
BUN/CREAT SERPL: 14.5 (ref 7–25)
CALCIUM SERPL-MCNC: 8.8 MG/DL (ref 8.6–10.5)
CHLORIDE SERPL-SCNC: 103 MMOL/L (ref 98–107)
CHOLEST SERPL-MCNC: 208 MG/DL (ref 0–200)
CO2 SERPL-SCNC: 27.7 MMOL/L (ref 22–29)
CREAT SERPL-MCNC: 0.69 MG/DL (ref 0.57–1)
CREAT UR-MCNC: 139.1 MG/DL
GLOBULIN SER CALC-MCNC: 2.1 GM/DL
GLUCOSE SERPL-MCNC: 125 MG/DL (ref 65–99)
HBA1C MFR BLD: 6.1 % (ref 4.8–5.6)
HDLC SERPL-MCNC: 40 MG/DL (ref 40–60)
LDLC SERPL CALC-MCNC: 135 MG/DL (ref 0–100)
MICROALBUMIN UR-MCNC: 14.8 UG/ML
POTASSIUM SERPL-SCNC: 4.3 MMOL/L (ref 3.5–5.2)
PROT SERPL-MCNC: 6.6 G/DL (ref 6–8.5)
SODIUM SERPL-SCNC: 141 MMOL/L (ref 136–145)
TRIGL SERPL-MCNC: 182 MG/DL (ref 0–150)
VLDLC SERPL CALC-MCNC: 33 MG/DL (ref 5–40)

## 2021-03-24 RX ORDER — PRAVASTATIN SODIUM 20 MG
20 TABLET ORAL DAILY
Qty: 90 TABLET | Refills: 3 | Status: SHIPPED | OUTPATIENT
Start: 2021-03-24 | End: 2021-04-12 | Stop reason: SDUPTHER

## 2021-04-12 DIAGNOSIS — E78.2 MIXED HYPERLIPIDEMIA: ICD-10-CM

## 2021-04-12 RX ORDER — PRAVASTATIN SODIUM 20 MG
20 TABLET ORAL DAILY
Qty: 90 TABLET | Refills: 3 | Status: SHIPPED | OUTPATIENT
Start: 2021-04-12 | End: 2022-06-03 | Stop reason: SDUPTHER

## 2021-04-12 NOTE — TELEPHONE ENCOUNTER
[facility]    Last ov 3/23/2021  Next ov 9/22/2021    Last fill 3/24/2021 to [facility]

## 2021-06-22 DIAGNOSIS — E11.9 TYPE 2 DIABETES MELLITUS WITHOUT COMPLICATION, WITHOUT LONG-TERM CURRENT USE OF INSULIN (HCC): ICD-10-CM

## 2021-09-01 ENCOUNTER — APPOINTMENT (OUTPATIENT)
Dept: WOMENS IMAGING | Facility: HOSPITAL | Age: 61
End: 2021-09-01

## 2021-09-01 ENCOUNTER — OFFICE VISIT (OUTPATIENT)
Dept: OBSTETRICS AND GYNECOLOGY | Age: 61
End: 2021-09-01

## 2021-09-01 ENCOUNTER — PROCEDURE VISIT (OUTPATIENT)
Dept: OBSTETRICS AND GYNECOLOGY | Age: 61
End: 2021-09-01

## 2021-09-01 VITALS
SYSTOLIC BLOOD PRESSURE: 132 MMHG | BODY MASS INDEX: 29.85 KG/M2 | DIASTOLIC BLOOD PRESSURE: 64 MMHG | HEIGHT: 67 IN | WEIGHT: 190.2 LBS

## 2021-09-01 DIAGNOSIS — Z12.31 VISIT FOR SCREENING MAMMOGRAM: Primary | ICD-10-CM

## 2021-09-01 DIAGNOSIS — Z01.419 WELL WOMAN EXAM WITH ROUTINE GYNECOLOGICAL EXAM: Primary | ICD-10-CM

## 2021-09-01 PROBLEM — K63.5 COLON POLYPS: Status: ACTIVE | Noted: 2021-09-01

## 2021-09-01 PROCEDURE — 77067 SCR MAMMO BI INCL CAD: CPT | Performed by: OBSTETRICS & GYNECOLOGY

## 2021-09-01 PROCEDURE — 99396 PREV VISIT EST AGE 40-64: CPT | Performed by: OBSTETRICS & GYNECOLOGY

## 2021-09-01 PROCEDURE — 77063 BREAST TOMOSYNTHESIS BI: CPT | Performed by: OBSTETRICS & GYNECOLOGY

## 2021-09-01 PROCEDURE — 77063 BREAST TOMOSYNTHESIS BI: CPT | Performed by: RADIOLOGY

## 2021-09-01 PROCEDURE — 77067 SCR MAMMO BI INCL CAD: CPT | Performed by: RADIOLOGY

## 2021-09-01 NOTE — PROGRESS NOTES
"Chief complaint: annual    Subjective   History of Present Illness    Ai Carreon is a 60 y.o.  who presents for annual exam. No gyn c/o. No PMB. Hot flashes not too bad, declines meds. Doesn't want vaginal estrogen.  Her menses are absent  MMG today   Colonoscopy up to date  2020 pap normal, HPV-  Pt's mother is BRCA gene negative  Soc hx-  at [facility]    Obstetric History:  OB History        3    Para   3    Term   3            AB        Living   3       SAB        TAB        Ectopic        Molar        Multiple        Live Births   3               Menstrual History:     No LMP recorded. Patient is postmenopausal.         Current contraception: tubal ligation  History of abnormal Pap smear: no  Received Gardasil immunization: no  Perform regular self breast exam: yes -    Family history of uterine or ovarian cancer: yes - MGM w/ ovarian cancer  Family History of colon cancer: no  Family history of breast cancer: no    Mammogram: done today.  Colonoscopy: up to date.  DEXA: not indicated.    Exercise: moderately active  Calcium/Vitamin D: adequate intake    The following portions of the patient's history were reviewed and updated as appropriate: allergies, current medications, past family history, past medical history, past social history, past surgical history and problem list.    Review of Systems   Constitutional: Negative.    Genitourinary: Negative.        Pertinent items are noted in HPI.     Objective   Physical Exam    /64   Ht 170.2 cm (67\")   Wt 86.3 kg (190 lb 3.2 oz)   Breastfeeding No   BMI 29.79 kg/m²     General:   alert, appears stated age and cooperative   Neck: no asymmetry, masses, or scars   Heart: regular rate and rhythm, S1, S2 normal, no murmur, click, rub or gallop   Lungs: clear to auscultation bilaterally   Abdomen: soft, non-tender, without masses or organomegaly   Breast: inspection negative, no nipple discharge or bleeding, no " masses or nodularity palpable   Vulva: Bartholin's, Urethra, [place]'s normal   Vagina: normal mucosa   Cervix: no cervical motion tenderness and no lesions   Uterus: normal size, mobile, non-tender, normal shape and consistency   Adnexa: normal adnexa and no mass, fullness, tenderness   Rectal: not indicated     Assessment/Plan   Diagnoses and all orders for this visit:    1. Well woman exam with routine gynecological exam (Primary)        Breast self exam technique reviewed and patient encouraged to perform self-exam monthly.  Discussed healthy lifestyle modifications.  Pap smear up to date

## 2021-09-22 ENCOUNTER — OFFICE VISIT (OUTPATIENT)
Dept: FAMILY MEDICINE CLINIC | Facility: CLINIC | Age: 61
End: 2021-09-22

## 2021-09-22 VITALS
BODY MASS INDEX: 30.07 KG/M2 | DIASTOLIC BLOOD PRESSURE: 82 MMHG | TEMPERATURE: 97.1 F | HEART RATE: 73 BPM | HEIGHT: 67 IN | WEIGHT: 191.6 LBS | SYSTOLIC BLOOD PRESSURE: 118 MMHG | OXYGEN SATURATION: 97 %

## 2021-09-22 DIAGNOSIS — E21.3 HYPERPARATHYROIDISM (HCC): ICD-10-CM

## 2021-09-22 DIAGNOSIS — E11.9 TYPE 2 DIABETES MELLITUS WITHOUT COMPLICATION, WITHOUT LONG-TERM CURRENT USE OF INSULIN (HCC): ICD-10-CM

## 2021-09-22 DIAGNOSIS — E78.2 MIXED HYPERLIPIDEMIA: Primary | ICD-10-CM

## 2021-09-22 PROCEDURE — 99214 OFFICE O/P EST MOD 30 MIN: CPT | Performed by: FAMILY MEDICINE

## 2021-09-22 NOTE — PROGRESS NOTES
Subjective   Ai Carreon is a 61 y.o. female.     Chief Complaint   Patient presents with   • Hyperlipidemia     HPI   Diabetes-doing well on medication, fasting blood sugar 110-120 on average.  No side effects.  Has seen a nutritionist and working hard on diet. Has slipped a little in the past few weeks as she went on a trip.     hyperlipidemia- no muscle aches and doing well.     High calcium-is seeing ENT.  Had a sestamibi scan. Has had surgery twice and doing well. Has increased her D3 and went down on calcium and just needs annual calcium and vit d from now on per last ent note.     The following portions of the patient's history were reviewed and updated as appropriate: allergies, current medications, past family history, past medical history, past social history, past surgical history and problem list.    Past Medical History:   Diagnosis Date   • Colon polyps     FOLLOWED BY DR. AMARI CASTRO   • Diabetes mellitus (CMS/HCC)     TYPE 2   • Hyperglycemia    • Hyperlipidemia     MIXED   • Kidney stone     SEEN IN PAST BY DR. HARDEEP VILLASENOR   • Pericarditis 1977   • Postmenopausal    • Right ovarian cyst 2002    S/P EXCISION       Past Surgical History:   Procedure Laterality Date   • COLONOSCOPY N/A 10/10/2019    5 MM TUBULAR ADENOMA POLYP IN SIGMOID, GRANULAR MUCOSA IN ILEOCECAL VALVE, BX: BENIGN, RESCOPE IN 5YRS, DR. AMARI CASTRO AT [facility]   • CYSTOSCOPY INSERTION / REMOVAL STENT / STONE N/A 10/31/2001    PLACEMENT OF LEFT DOUBLE J STENT, 6x24, DR. HARDEEP VILLASENOR AT [facility]   • LAPAROSCOPIC SALPINGOOPHERECTOMY Left 01/24/2002     AND EXCISION OF RIGHT OVARIAN CYST, DR. CHANDLER HAIR AT [facility]   • PARATHYROIDECTOMY  05/2020   • PELVIC LAPAROSCOPY      [facility]   • TUBAL ABDOMINAL LIGATION Bilateral 1990    POSTPARTUM   • URETEROSCOPY STENT INSERTION N/A 07/13/2009    WITH STONE EXTRACTION, DR. HARDEEP VILLASENOR AT [facility]       Family History   Problem Relation Age of Onset   • Melanoma Mother    • Cancer Mother    • Coronary artery  "disease Father    • Prostate cancer Father    • Diabetes Father    • Heart disease Father    • No Known Problems Sister    • Cancer Brother    • Melanoma Brother    • Prostate cancer Brother    • No Known Problems Daughter    • No Known Problems Son    • Ovarian cancer Maternal Grandmother    • Cancer Maternal Grandmother    • No Known Problems Maternal Aunt    • No Known Problems Paternal Aunt    • Cancer Paternal Grandmother    • BRCA 1/2 Neg Hx    • Breast cancer Neg Hx    • Colon cancer Neg Hx    • Endometrial cancer Neg Hx    • Uterine cancer Neg Hx        Social History     Socioeconomic History   • Marital status:      Spouse name: Not on file   • Number of children: Not on file   • Years of education: Not on file   • Highest education level: Not on file   Tobacco Use   • Smoking status: Never Smoker   • Smokeless tobacco: Never Used   Vaping Use   • Vaping Use: Never used   Substance and Sexual Activity   • Alcohol use: No   • Drug use: No   • Sexual activity: Yes     Partners: Male     Birth control/protection: Post-menopausal, Surgical     Comment: Tubal ligation       Review of Systems   Constitutional: Negative for fever.   Respiratory: Negative for shortness of breath.        Objective   Visit Vitals  /82 (BP Location: Left arm, Patient Position: Sitting)   Pulse 73   Temp 97.1 °F (36.2 °C)   Ht 170.2 cm (67.01\")   Wt 86.9 kg (191 lb 9.6 oz)   SpO2 97%   BMI 30.00 kg/m²     Body mass index is 30 kg/m².  Physical Exam   Constitutional: She is oriented to person, place, and time. She appears well-developed and well-nourished.   Cardiovascular: Normal rate, regular rhythm and normal heart sounds.   Pulmonary/Chest: Effort normal and breath sounds normal.   Abdominal: Normal appearance.   Musculoskeletal: Normal range of motion.   Neurological: She is alert and oriented to person, place, and time.   Skin: Skin is warm and dry.   Psychiatric: Her behavior is normal. Mood normal. "         Assessment/Plan   Diagnoses and all orders for this visit:    1. Mixed hyperlipidemia (Primary)    2. Type 2 diabetes mellitus without complication, without long-term current use of insulin (CMS/[facility])  -     Comprehensive Metabolic Panel  -     Lipid Panel  -     Hemoglobin A1c  -     Microalbumin / Creatinine Urine Ratio - Urine, Clean Catch    3. Hyperparathyroidism (CMS/[facility])  -     Vitamin D 25 Hydroxy          Cont meds, f/u in 3-6 months. Stable.

## 2021-09-23 LAB
25(OH)D3+25(OH)D2 SERPL-MCNC: 39.7 NG/ML (ref 30–100)
ALBUMIN SERPL-MCNC: 4.6 G/DL (ref 3.5–5.2)
ALBUMIN/CREAT UR: 17 MG/G CREAT (ref 0–29)
ALBUMIN/GLOB SERPL: 2.6 G/DL
ALP SERPL-CCNC: 100 U/L (ref 39–117)
ALT SERPL-CCNC: 28 U/L (ref 1–33)
AST SERPL-CCNC: 19 U/L (ref 1–32)
BILIRUB SERPL-MCNC: 0.2 MG/DL (ref 0–1.2)
BUN SERPL-MCNC: 14 MG/DL (ref 8–23)
BUN/CREAT SERPL: 20.6 (ref 7–25)
CALCIUM SERPL-MCNC: 9.1 MG/DL (ref 8.6–10.5)
CHLORIDE SERPL-SCNC: 105 MMOL/L (ref 98–107)
CHOLEST SERPL-MCNC: 187 MG/DL (ref 0–200)
CO2 SERPL-SCNC: 25.9 MMOL/L (ref 22–29)
CREAT SERPL-MCNC: 0.68 MG/DL (ref 0.57–1)
CREAT UR-MCNC: 131.3 MG/DL
GLOBULIN SER CALC-MCNC: 1.8 GM/DL
GLUCOSE SERPL-MCNC: 137 MG/DL (ref 65–99)
HBA1C MFR BLD: 6.3 % (ref 4.8–5.6)
HDLC SERPL-MCNC: 41 MG/DL (ref 40–60)
LDLC SERPL CALC-MCNC: 108 MG/DL (ref 0–100)
MICROALBUMIN UR-MCNC: 21.7 UG/ML
POTASSIUM SERPL-SCNC: 4.4 MMOL/L (ref 3.5–5.2)
PROT SERPL-MCNC: 6.4 G/DL (ref 6–8.5)
SODIUM SERPL-SCNC: 141 MMOL/L (ref 136–145)
TRIGL SERPL-MCNC: 218 MG/DL (ref 0–150)
VLDLC SERPL CALC-MCNC: 38 MG/DL (ref 5–40)

## 2021-10-22 DIAGNOSIS — E11.9 TYPE 2 DIABETES MELLITUS WITHOUT COMPLICATION, WITHOUT LONG-TERM CURRENT USE OF INSULIN (HCC): ICD-10-CM

## 2021-10-24 RX ORDER — METFORMIN HYDROCHLORIDE 500 MG/1
TABLET, EXTENDED RELEASE ORAL
Qty: 360 TABLET | Refills: 3 | Status: SHIPPED | OUTPATIENT
Start: 2021-10-24 | End: 2022-06-03 | Stop reason: SDUPTHER

## 2021-10-24 NOTE — TELEPHONE ENCOUNTER
Rx Refill Note  Requested Prescriptions     Pending Prescriptions Disp Refills   • metFORMIN ER (GLUCOPHAGE-XR) 500 MG 24 hr tablet [Pharmacy Med Name: metFORMIN HCl  MG Oral Tablet Extended Release 24 Hour] 360 tablet 3     Sig: TAKE 2 TABLETS BY MOUTH  TWICE DAILY      Last office visit with prescribing clinician: 9/22/2021      Next office visit with prescribing clinician: 3/23/2022             Last filled 3/23/2021           Latohsa Bills MA  10/24/21, 16:01 EDT

## 2022-06-03 DIAGNOSIS — E78.2 MIXED HYPERLIPIDEMIA: ICD-10-CM

## 2022-06-03 DIAGNOSIS — E11.9 TYPE 2 DIABETES MELLITUS WITHOUT COMPLICATION, WITHOUT LONG-TERM CURRENT USE OF INSULIN: ICD-10-CM

## 2022-06-03 NOTE — TELEPHONE ENCOUNTER
Caller: Ai Carreon    Relationship: Self    Best call back number: [phone]    Requested Prescriptions:   Requested Prescriptions     Pending Prescriptions Disp Refills   • metFORMIN ER (GLUCOPHAGE-XR) 500 MG 24 hr tablet 360 tablet 3     Sig: Take 2 tablets by mouth 2 (Two) Times a Day.   • pravastatin (Pravachol) 20 MG tablet 90 tablet 3     Sig: Take 1 tablet by mouth Daily.   • glucose blood test strip 100 each 12     Sig: Use as instructed Use to check blood sugar dx: E11.9        Pharmacy where request should be sent: [facility]ACY #[id number] - [place], [place] - [address] - [phone]  - [phone] FX     Additional details provided by patient: PATIENT IS CHANGING PHARMACY    Does the patient have less than a 3 day supply:  [] Yes  [x] No    Elena Goyal Rep   06/03/22 13:55 EDT

## 2022-06-06 RX ORDER — METFORMIN HYDROCHLORIDE 500 MG/1
1000 TABLET, EXTENDED RELEASE ORAL 2 TIMES DAILY
Qty: 360 TABLET | Refills: 1 | Status: SHIPPED | OUTPATIENT
Start: 2022-06-06 | End: 2022-12-21 | Stop reason: SDUPTHER

## 2022-06-06 RX ORDER — PRAVASTATIN SODIUM 20 MG
20 TABLET ORAL DAILY
Qty: 90 TABLET | Refills: 0 | Status: SHIPPED | OUTPATIENT
Start: 2022-06-06 | End: 2022-09-12

## 2022-06-06 NOTE — TELEPHONE ENCOUNTER
Rx Refill Note  Requested Prescriptions     Pending Prescriptions Disp Refills   • metFORMIN ER (GLUCOPHAGE-XR) 500 MG 24 hr tablet 360 tablet 3     Sig: Take 2 tablets by mouth 2 (Two) Times a Day.   • pravastatin (Pravachol) 20 MG tablet 90 tablet 3     Sig: Take 1 tablet by mouth Daily.   • glucose blood test strip 100 each 12     Sig: Use as instructed Use to check blood sugar dx: E11.9      Last office visit with prescribing clinician: 9/22/2021      Next office visit with prescribing clinician:  6/13/22       Mary Pagan MA  06/06/22, 08:52 EDT

## 2022-06-13 ENCOUNTER — OFFICE VISIT (OUTPATIENT)
Dept: FAMILY MEDICINE CLINIC | Facility: CLINIC | Age: 62
End: 2022-06-13

## 2022-06-13 VITALS
HEART RATE: 69 BPM | DIASTOLIC BLOOD PRESSURE: 78 MMHG | TEMPERATURE: 98.2 F | WEIGHT: 191 LBS | BODY MASS INDEX: 28.95 KG/M2 | HEIGHT: 68 IN | SYSTOLIC BLOOD PRESSURE: 132 MMHG | OXYGEN SATURATION: 99 %

## 2022-06-13 DIAGNOSIS — E78.2 MIXED HYPERLIPIDEMIA: ICD-10-CM

## 2022-06-13 DIAGNOSIS — E21.3 HYPERPARATHYROIDISM: ICD-10-CM

## 2022-06-13 DIAGNOSIS — E11.9 TYPE 2 DIABETES MELLITUS WITHOUT COMPLICATION, WITHOUT LONG-TERM CURRENT USE OF INSULIN: Primary | ICD-10-CM

## 2022-06-13 PROCEDURE — 99214 OFFICE O/P EST MOD 30 MIN: CPT | Performed by: FAMILY MEDICINE

## 2022-06-13 NOTE — PROGRESS NOTES
Subjective   Ai Carreon is a 61 y.o. female.     Chief Complaint   Patient presents with   • Thyroid Problem     F/u     HPI   Diabetes-doing well on medication, fasting blood sugar 110-120 on average.  No side effects.  Has seen a nutritionist and working hard on diet.     hyperlipidemia- no muscle aches and doing well.     High calcium-is seeing ENT.  Had a sestamibi scan. Has had surgery twice and doing well. Has increased her D3 and went down on calcium and just needs annual calcium and vit d from now on per last ent note. She stopped her calcium and vit d per ent.       The following portions of the patient's history were reviewed and updated as appropriate: allergies, current medications, past family history, past medical history, past social history, past surgical history and problem list.    Past Medical History:   Diagnosis Date   • Colon polyps     FOLLOWED BY DR. AMARI CASTRO   • Diabetes mellitus (HCC)     TYPE 2   • Hyperglycemia    • Hyperlipidemia     MIXED   • Kidney stone     SEEN IN PAST BY DR. HARDEEP VILLASENOR   • Pericarditis 1977   • Postmenopausal    • Right ovarian cyst 2002    S/P EXCISION       Past Surgical History:   Procedure Laterality Date   • COLONOSCOPY N/A 10/10/2019    5 MM TUBULAR ADENOMA POLYP IN SIGMOID, GRANULAR MUCOSA IN ILEOCECAL VALVE, BX: BENIGN, RESCOPE IN 5YRS, DR. AMARI CASTRO AT [facility]   • CYSTOSCOPY INSERTION / REMOVAL STENT / STONE N/A 10/31/2001    PLACEMENT OF LEFT DOUBLE J STENT, 6x24, DR. HARDEEP VILLASENOR AT [facility]   • LAPAROSCOPIC SALPINGOOPHERECTOMY Left 01/24/2002     AND EXCISION OF RIGHT OVARIAN CYST, DR. CHANDLER HAIR AT [facility]   • PARATHYROIDECTOMY  05/2020   • PELVIC LAPAROSCOPY      [facility]   • TUBAL ABDOMINAL LIGATION Bilateral 1990    POSTPARTUM   • URETEROSCOPY STENT INSERTION N/A 07/13/2009    WITH STONE EXTRACTION, DR. HARDEEP VILLASENOR AT [facility]       Family History   Problem Relation Age of Onset   • Melanoma Mother    • Cancer Mother    • Coronary artery disease Father    •  "Prostate cancer Father    • Diabetes Father    • Heart disease Father    • No Known Problems Sister    • Cancer Brother    • Melanoma Brother    • Prostate cancer Brother    • No Known Problems Daughter    • No Known Problems Son    • Ovarian cancer Maternal Grandmother    • Cancer Maternal Grandmother    • No Known Problems Maternal Aunt    • No Known Problems Paternal Aunt    • Cancer Paternal Grandmother    • BRCA 1/2 Neg Hx    • Breast cancer Neg Hx    • Colon cancer Neg Hx    • Endometrial cancer Neg Hx    • Uterine cancer Neg Hx        Social History     Socioeconomic History   • Marital status:    Tobacco Use   • Smoking status: Never Smoker   • Smokeless tobacco: Never Used   Vaping Use   • Vaping Use: Never used   Substance and Sexual Activity   • Alcohol use: No   • Drug use: No   • Sexual activity: Yes     Partners: Male     Birth control/protection: Post-menopausal, Surgical     Comment: Tubal ligation       Review of Systems   Constitutional: Negative for fever.   Respiratory: Negative for shortness of breath.        Objective   Visit Vitals  /78   Pulse 69   Temp 98.2 °F (36.8 °C) (Infrared)   Ht 171.5 cm (67.5\")   Wt 86.6 kg (191 lb)   SpO2 99%   BMI 29.47 kg/m²     Body mass index is 29.47 kg/m².  Physical Exam   Constitutional: She is oriented to person, place, and time. She appears well-developed and well-nourished.   Cardiovascular: Normal rate, regular rhythm and normal heart sounds.   Pulmonary/Chest: Effort normal and breath sounds normal.   Abdominal: Normal appearance.   Musculoskeletal: Normal range of motion.   Neurological: She is alert and oriented to person, place, and time.   Skin: Skin is warm and dry.   Psychiatric: Her behavior is normal. Mood normal.         Assessment & Plan   Diagnoses and all orders for this visit:    1. Type 2 diabetes mellitus without complication, without long-term current use of insulin (HCC) (Primary)  -     Comprehensive Metabolic Panel  -     " Lipid Panel  -     Hemoglobin A1c  -     Microalbumin / Creatinine Urine Ratio - Urine, Clean Catch    2. Mixed hyperlipidemia    3. Hyperparathyroidism (HCC)  -     Vitamin D 25 Hydroxy          Cont meds, f/u in 6 months. Stable.

## 2022-06-14 LAB
25(OH)D3+25(OH)D2 SERPL-MCNC: 37.1 NG/ML (ref 30–100)
ALBUMIN SERPL-MCNC: 4.4 G/DL (ref 3.8–4.8)
ALBUMIN/CREAT UR: 8 MG/G CREAT (ref 0–29)
ALBUMIN/GLOB SERPL: 2.2 {RATIO} (ref 1.2–2.2)
ALP SERPL-CCNC: 116 IU/L (ref 44–121)
ALT SERPL-CCNC: 28 IU/L (ref 0–32)
AST SERPL-CCNC: 19 IU/L (ref 0–40)
BILIRUB SERPL-MCNC: 0.3 MG/DL (ref 0–1.2)
BUN SERPL-MCNC: 18 MG/DL (ref 8–27)
BUN/CREAT SERPL: 27 (ref 12–28)
CALCIUM SERPL-MCNC: 9.7 MG/DL (ref 8.7–10.3)
CHLORIDE SERPL-SCNC: 103 MMOL/L (ref 96–106)
CHOLEST SERPL-MCNC: 209 MG/DL (ref 100–199)
CO2 SERPL-SCNC: 21 MMOL/L (ref 20–29)
CREAT SERPL-MCNC: 0.67 MG/DL (ref 0.57–1)
CREAT UR-MCNC: 102.6 MG/DL
EGFRCR SERPLBLD CKD-EPI 2021: 99 ML/MIN/1.73
GLOBULIN SER CALC-MCNC: 2 G/DL (ref 1.5–4.5)
GLUCOSE SERPL-MCNC: 150 MG/DL (ref 65–99)
HBA1C MFR BLD: 6.4 % (ref 4.8–5.6)
HDLC SERPL-MCNC: 39 MG/DL
LDLC SERPL CALC-MCNC: 140 MG/DL (ref 0–99)
MICROALBUMIN UR-MCNC: 8.2 UG/ML
POTASSIUM SERPL-SCNC: 4.4 MMOL/L (ref 3.5–5.2)
PROT SERPL-MCNC: 6.4 G/DL (ref 6–8.5)
SODIUM SERPL-SCNC: 140 MMOL/L (ref 134–144)
TRIGL SERPL-MCNC: 167 MG/DL (ref 0–149)
VLDLC SERPL CALC-MCNC: 30 MG/DL (ref 5–40)

## 2022-06-21 ENCOUNTER — TELEPHONE (OUTPATIENT)
Dept: FAMILY MEDICINE CLINIC | Facility: CLINIC | Age: 62
End: 2022-06-21

## 2022-06-21 NOTE — TELEPHONE ENCOUNTER
Caller: Ai Carreon    Relationship: Self    [phone]    What was the call regarding: PATIENT RECEIVED HER METFORMIN YESTERDAY BUT IT SAYS HCL ON IT AS WELL AND SHE IS AFRAID IT IS THE WRONG MEDICATION . PLEASE ADVISE.     Do you require a callback: YES

## 2022-08-22 ENCOUNTER — TELEPHONE (OUTPATIENT)
Dept: FAMILY MEDICINE CLINIC | Facility: CLINIC | Age: 62
End: 2022-08-22

## 2022-08-22 NOTE — TELEPHONE ENCOUNTER
Records release from [facility] in [place] called and said they were contacted by the patient's daughter saying we were requesting her records from them. The lady at the [facility] couldn't give me the patient's birthday because she didn't get it from the caller. She did give me the fax number for when the patient fills out a records request form. The fax number is [phone].

## 2022-08-29 ENCOUNTER — APPOINTMENT (OUTPATIENT)
Dept: CT IMAGING | Facility: HOSPITAL | Age: 62
End: 2022-08-29

## 2022-08-29 ENCOUNTER — APPOINTMENT (OUTPATIENT)
Dept: GENERAL RADIOLOGY | Facility: HOSPITAL | Age: 62
End: 2022-08-29

## 2022-08-29 ENCOUNTER — HOSPITAL ENCOUNTER (OUTPATIENT)
Facility: HOSPITAL | Age: 62
Setting detail: OBSERVATION
Discharge: HOME | End: 2022-08-31
Attending: EMERGENCY MEDICINE | Admitting: INTERNAL MEDICINE

## 2022-08-29 DIAGNOSIS — G93.6 VASOGENIC EDEMA: ICD-10-CM

## 2022-08-29 DIAGNOSIS — R41.0 INTERMITTENT CONFUSION: ICD-10-CM

## 2022-08-29 DIAGNOSIS — D49.6 BRAIN TUMOR: Primary | ICD-10-CM

## 2022-08-29 PROBLEM — G93.89 FRONTAL MASS OF BRAIN: Status: ACTIVE | Noted: 2022-08-29

## 2022-08-29 LAB
ALBUMIN SERPL-MCNC: 4.4 G/DL (ref 3.5–5.2)
ALBUMIN/GLOB SERPL: 1.8 G/DL
ALP SERPL-CCNC: 88 U/L (ref 39–117)
ALT SERPL W P-5'-P-CCNC: 13 U/L (ref 1–33)
ANION GAP SERPL CALCULATED.3IONS-SCNC: 14.9 MMOL/L (ref 5–15)
APTT PPP: 33.5 SECONDS (ref 22.7–35.4)
AST SERPL-CCNC: 14 U/L (ref 1–32)
BASOPHILS # BLD AUTO: 0.04 10*3/MM3 (ref 0–0.2)
BASOPHILS NFR BLD AUTO: 0.6 % (ref 0–1.5)
BILIRUB SERPL-MCNC: 0.2 MG/DL (ref 0–1.2)
BILIRUB UR QL STRIP: NEGATIVE
BUN SERPL-MCNC: 10 MG/DL (ref 8–23)
BUN/CREAT SERPL: 14.1 (ref 7–25)
CALCIUM SPEC-SCNC: 9.3 MG/DL (ref 8.6–10.5)
CHLORIDE SERPL-SCNC: 104 MMOL/L (ref 98–107)
CLARITY UR: CLEAR
CO2 SERPL-SCNC: 25.1 MMOL/L (ref 22–29)
COLOR UR: YELLOW
CREAT SERPL-MCNC: 0.71 MG/DL (ref 0.57–1)
DEPRECATED RDW RBC AUTO: 41.3 FL (ref 37–54)
EGFRCR SERPLBLD CKD-EPI 2021: 96.9 ML/MIN/1.73
EOSINOPHIL # BLD AUTO: 0.1 10*3/MM3 (ref 0–0.4)
EOSINOPHIL NFR BLD AUTO: 1.4 % (ref 0.3–6.2)
ERYTHROCYTE [DISTWIDTH] IN BLOOD BY AUTOMATED COUNT: 12.8 % (ref 12.3–15.4)
GLOBULIN UR ELPH-MCNC: 2.4 GM/DL
GLUCOSE BLDC GLUCOMTR-MCNC: 105 MG/DL (ref 70–130)
GLUCOSE SERPL-MCNC: 117 MG/DL (ref 65–99)
GLUCOSE UR STRIP-MCNC: NEGATIVE MG/DL
HCT VFR BLD AUTO: 40.7 % (ref 34–46.6)
HGB BLD-MCNC: 13.8 G/DL (ref 12–15.9)
HGB UR QL STRIP.AUTO: NEGATIVE
HOLD SPECIMEN: NORMAL
IMM GRANULOCYTES # BLD AUTO: 0.03 10*3/MM3 (ref 0–0.05)
IMM GRANULOCYTES NFR BLD AUTO: 0.4 % (ref 0–0.5)
INR PPP: 1.03 (ref 0.9–1.1)
KETONES UR QL STRIP: ABNORMAL
LEUKOCYTE ESTERASE UR QL STRIP.AUTO: NEGATIVE
LYMPHOCYTES # BLD AUTO: 1.12 10*3/MM3 (ref 0.7–3.1)
LYMPHOCYTES NFR BLD AUTO: 16.2 % (ref 19.6–45.3)
MAGNESIUM SERPL-MCNC: 1.9 MG/DL (ref 1.6–2.4)
MCH RBC QN AUTO: 29.8 PG (ref 26.6–33)
MCHC RBC AUTO-ENTMCNC: 33.9 G/DL (ref 31.5–35.7)
MCV RBC AUTO: 87.9 FL (ref 79–97)
MONOCYTES # BLD AUTO: 0.38 10*3/MM3 (ref 0.1–0.9)
MONOCYTES NFR BLD AUTO: 5.5 % (ref 5–12)
NEUTROPHILS NFR BLD AUTO: 5.23 10*3/MM3 (ref 1.7–7)
NEUTROPHILS NFR BLD AUTO: 75.9 % (ref 42.7–76)
NITRITE UR QL STRIP: NEGATIVE
NRBC BLD AUTO-RTO: 0 /100 WBC (ref 0–0.2)
PH UR STRIP.AUTO: <=5 [PH] (ref 5–8)
PLATELET # BLD AUTO: 249 10*3/MM3 (ref 140–450)
PMV BLD AUTO: 10 FL (ref 6–12)
POTASSIUM SERPL-SCNC: 4.5 MMOL/L (ref 3.5–5.2)
PROT SERPL-MCNC: 6.8 G/DL (ref 6–8.5)
PROT UR QL STRIP: NEGATIVE
PROTHROMBIN TIME: 13.4 SECONDS (ref 11.7–14.2)
QT INTERVAL: 371 MS
RBC # BLD AUTO: 4.63 10*6/MM3 (ref 3.77–5.28)
SARS-COV-2 RNA RESP QL NAA+PROBE: NOT DETECTED
SODIUM SERPL-SCNC: 144 MMOL/L (ref 136–145)
SP GR UR STRIP: 1.02 (ref 1–1.03)
TROPONIN T SERPL-MCNC: <0.01 NG/ML (ref 0–0.03)
UROBILINOGEN UR QL STRIP: ABNORMAL
WBC NRBC COR # BLD: 6.9 10*3/MM3 (ref 3.4–10.8)
WHOLE BLOOD HOLD COAG: NORMAL
WHOLE BLOOD HOLD SPECIMEN: NORMAL

## 2022-08-29 PROCEDURE — G0378 HOSPITAL OBSERVATION PER HR: HCPCS

## 2022-08-29 PROCEDURE — 71260 CT THORAX DX C+: CPT

## 2022-08-29 PROCEDURE — 96374 THER/PROPH/DIAG INJ IV PUSH: CPT

## 2022-08-29 PROCEDURE — 84484 ASSAY OF TROPONIN QUANT: CPT | Performed by: EMERGENCY MEDICINE

## 2022-08-29 PROCEDURE — 71045 X-RAY EXAM CHEST 1 VIEW: CPT

## 2022-08-29 PROCEDURE — 25010000002 DEXAMETHASONE PER 1 MG: Performed by: NURSE PRACTITIONER

## 2022-08-29 PROCEDURE — 85730 THROMBOPLASTIN TIME PARTIAL: CPT | Performed by: NURSE PRACTITIONER

## 2022-08-29 PROCEDURE — 25010000002 IOPAMIDOL 61 % SOLUTION: Performed by: EMERGENCY MEDICINE

## 2022-08-29 PROCEDURE — 82962 GLUCOSE BLOOD TEST: CPT

## 2022-08-29 PROCEDURE — 99284 EMERGENCY DEPT VISIT MOD MDM: CPT

## 2022-08-29 PROCEDURE — 70450 CT HEAD/BRAIN W/O DYE: CPT

## 2022-08-29 PROCEDURE — 83735 ASSAY OF MAGNESIUM: CPT | Performed by: EMERGENCY MEDICINE

## 2022-08-29 PROCEDURE — 99223 1ST HOSP IP/OBS HIGH 75: CPT | Performed by: NURSE PRACTITIONER

## 2022-08-29 PROCEDURE — 85610 PROTHROMBIN TIME: CPT | Performed by: NURSE PRACTITIONER

## 2022-08-29 PROCEDURE — U0003 INFECTIOUS AGENT DETECTION BY NUCLEIC ACID (DNA OR RNA); SEVERE ACUTE RESPIRATORY SYNDROME CORONAVIRUS 2 (SARS-COV-2) (CORONAVIRUS DISEASE [COVID-19]), AMPLIFIED PROBE TECHNIQUE, MAKING USE OF HIGH THROUGHPUT TECHNOLOGIES AS DESCRIBED BY CMS-2020-01-R: HCPCS | Performed by: EMERGENCY MEDICINE

## 2022-08-29 PROCEDURE — 96375 TX/PRO/DX INJ NEW DRUG ADDON: CPT

## 2022-08-29 PROCEDURE — 80053 COMPREHEN METABOLIC PANEL: CPT | Performed by: EMERGENCY MEDICINE

## 2022-08-29 PROCEDURE — 93010 ELECTROCARDIOGRAM REPORT: CPT | Performed by: INTERNAL MEDICINE

## 2022-08-29 PROCEDURE — 74177 CT ABD & PELVIS W/CONTRAST: CPT

## 2022-08-29 PROCEDURE — 96376 TX/PRO/DX INJ SAME DRUG ADON: CPT

## 2022-08-29 PROCEDURE — 25010000002 LEVETRIRACETAM PER 10 MG: Performed by: NURSE PRACTITIONER

## 2022-08-29 PROCEDURE — 81003 URINALYSIS AUTO W/O SCOPE: CPT

## 2022-08-29 PROCEDURE — 93005 ELECTROCARDIOGRAM TRACING: CPT | Performed by: EMERGENCY MEDICINE

## 2022-08-29 PROCEDURE — C9803 HOPD COVID-19 SPEC COLLECT: HCPCS

## 2022-08-29 PROCEDURE — 85025 COMPLETE CBC W/AUTO DIFF WBC: CPT

## 2022-08-29 PROCEDURE — 93005 ELECTROCARDIOGRAM TRACING: CPT

## 2022-08-29 RX ORDER — DEXAMETHASONE SODIUM PHOSPHATE 4 MG/ML
4 INJECTION, SOLUTION INTRA-ARTICULAR; INTRALESIONAL; INTRAMUSCULAR; INTRAVENOUS; SOFT TISSUE EVERY 6 HOURS
Status: DISCONTINUED | OUTPATIENT
Start: 2022-08-29 | End: 2022-08-30

## 2022-08-29 RX ORDER — ONDANSETRON 4 MG/1
4 TABLET, FILM COATED ORAL EVERY 6 HOURS PRN
Status: DISCONTINUED | OUTPATIENT
Start: 2022-08-29 | End: 2022-08-31 | Stop reason: HOSPADM

## 2022-08-29 RX ORDER — UREA 10 %
3 LOTION (ML) TOPICAL NIGHTLY PRN
Status: DISCONTINUED | OUTPATIENT
Start: 2022-08-29 | End: 2022-08-31 | Stop reason: HOSPADM

## 2022-08-29 RX ORDER — SODIUM CHLORIDE 0.9 % (FLUSH) 0.9 %
10 SYRINGE (ML) INJECTION AS NEEDED
Status: DISCONTINUED | OUTPATIENT
Start: 2022-08-29 | End: 2022-08-31 | Stop reason: HOSPADM

## 2022-08-29 RX ORDER — NICOTINE POLACRILEX 4 MG
15 LOZENGE BUCCAL
Status: DISCONTINUED | OUTPATIENT
Start: 2022-08-29 | End: 2022-08-31 | Stop reason: HOSPADM

## 2022-08-29 RX ORDER — ACETAMINOPHEN 325 MG/1
650 TABLET ORAL EVERY 4 HOURS PRN
Status: DISCONTINUED | OUTPATIENT
Start: 2022-08-29 | End: 2022-08-31 | Stop reason: HOSPADM

## 2022-08-29 RX ORDER — FAMOTIDINE 10 MG/ML
20 INJECTION, SOLUTION INTRAVENOUS EVERY 12 HOURS SCHEDULED
Status: DISCONTINUED | OUTPATIENT
Start: 2022-08-29 | End: 2022-08-30

## 2022-08-29 RX ORDER — PRAVASTATIN SODIUM 20 MG
20 TABLET ORAL DAILY
Status: DISCONTINUED | OUTPATIENT
Start: 2022-08-30 | End: 2022-08-31 | Stop reason: HOSPADM

## 2022-08-29 RX ORDER — INSULIN LISPRO 100 [IU]/ML
0-9 INJECTION, SOLUTION INTRAVENOUS; SUBCUTANEOUS
Status: DISCONTINUED | OUTPATIENT
Start: 2022-08-30 | End: 2022-08-31 | Stop reason: HOSPADM

## 2022-08-29 RX ORDER — ONDANSETRON 2 MG/ML
4 INJECTION INTRAMUSCULAR; INTRAVENOUS EVERY 6 HOURS PRN
Status: DISCONTINUED | OUTPATIENT
Start: 2022-08-29 | End: 2022-08-31 | Stop reason: HOSPADM

## 2022-08-29 RX ORDER — DEXTROSE MONOHYDRATE 25 G/50ML
25 INJECTION, SOLUTION INTRAVENOUS
Status: DISCONTINUED | OUTPATIENT
Start: 2022-08-29 | End: 2022-08-31 | Stop reason: HOSPADM

## 2022-08-29 RX ORDER — NITROGLYCERIN 0.4 MG/1
0.4 TABLET SUBLINGUAL
Status: DISCONTINUED | OUTPATIENT
Start: 2022-08-29 | End: 2022-08-31 | Stop reason: HOSPADM

## 2022-08-29 RX ORDER — LEVETIRACETAM 500 MG/5ML
500 INJECTION, SOLUTION, CONCENTRATE INTRAVENOUS EVERY 12 HOURS SCHEDULED
Status: DISCONTINUED | OUTPATIENT
Start: 2022-08-29 | End: 2022-08-30

## 2022-08-29 RX ADMIN — DEXAMETHASONE SODIUM PHOSPHATE 4 MG: 4 INJECTION, SOLUTION INTRAMUSCULAR; INTRAVENOUS at 23:01

## 2022-08-29 RX ADMIN — Medication 3 MG: at 20:37

## 2022-08-29 RX ADMIN — LEVETIRACETAM 500 MG: 100 INJECTION INTRAVENOUS at 23:01

## 2022-08-29 RX ADMIN — IOPAMIDOL 85 ML: 612 INJECTION, SOLUTION INTRAVENOUS at 17:33

## 2022-08-29 RX ADMIN — DEXAMETHASONE SODIUM PHOSPHATE 4 MG: 4 INJECTION, SOLUTION INTRAMUSCULAR; INTRAVENOUS at 17:46

## 2022-08-29 RX ADMIN — FAMOTIDINE 20 MG: 10 INJECTION INTRAVENOUS at 23:01

## 2022-08-29 RX ADMIN — ACETAMINOPHEN 650 MG: 325 TABLET, FILM COATED ORAL at 20:37

## 2022-08-29 RX ADMIN — Medication 10 ML: at 20:36

## 2022-08-30 ENCOUNTER — APPOINTMENT (OUTPATIENT)
Dept: MRI IMAGING | Facility: HOSPITAL | Age: 62
End: 2022-08-30

## 2022-08-30 LAB
ANION GAP SERPL CALCULATED.3IONS-SCNC: 11 MMOL/L (ref 5–15)
BUN SERPL-MCNC: 10 MG/DL (ref 8–23)
BUN/CREAT SERPL: 16.7 (ref 7–25)
CALCIUM SPEC-SCNC: 9.2 MG/DL (ref 8.6–10.5)
CHLORIDE SERPL-SCNC: 104 MMOL/L (ref 98–107)
CO2 SERPL-SCNC: 25 MMOL/L (ref 22–29)
CREAT SERPL-MCNC: 0.6 MG/DL (ref 0.57–1)
DEPRECATED RDW RBC AUTO: 42.2 FL (ref 37–54)
EGFRCR SERPLBLD CKD-EPI 2021: 102.3 ML/MIN/1.73
ERYTHROCYTE [DISTWIDTH] IN BLOOD BY AUTOMATED COUNT: 12.8 % (ref 12.3–15.4)
GLUCOSE BLDC GLUCOMTR-MCNC: 120 MG/DL (ref 70–130)
GLUCOSE BLDC GLUCOMTR-MCNC: 152 MG/DL (ref 70–130)
GLUCOSE BLDC GLUCOMTR-MCNC: 172 MG/DL (ref 70–130)
GLUCOSE BLDC GLUCOMTR-MCNC: 220 MG/DL (ref 70–130)
GLUCOSE SERPL-MCNC: 164 MG/DL (ref 65–99)
HBA1C MFR BLD: 6.1 % (ref 4.8–5.6)
HCT VFR BLD AUTO: 40.8 % (ref 34–46.6)
HGB BLD-MCNC: 13.3 G/DL (ref 12–15.9)
MCH RBC QN AUTO: 29.7 PG (ref 26.6–33)
MCHC RBC AUTO-ENTMCNC: 32.6 G/DL (ref 31.5–35.7)
MCV RBC AUTO: 91.1 FL (ref 79–97)
PLATELET # BLD AUTO: 226 10*3/MM3 (ref 140–450)
PMV BLD AUTO: 9.9 FL (ref 6–12)
POTASSIUM SERPL-SCNC: 4.1 MMOL/L (ref 3.5–5.2)
RBC # BLD AUTO: 4.48 10*6/MM3 (ref 3.77–5.28)
SODIUM SERPL-SCNC: 140 MMOL/L (ref 136–145)
WBC NRBC COR # BLD: 7.31 10*3/MM3 (ref 3.4–10.8)

## 2022-08-30 PROCEDURE — 82962 GLUCOSE BLOOD TEST: CPT

## 2022-08-30 PROCEDURE — 25010000002 DEXAMETHASONE PER 1 MG: Performed by: NURSE PRACTITIONER

## 2022-08-30 PROCEDURE — 70553 MRI BRAIN STEM W/O & W/DYE: CPT

## 2022-08-30 PROCEDURE — G0378 HOSPITAL OBSERVATION PER HR: HCPCS

## 2022-08-30 PROCEDURE — 85027 COMPLETE CBC AUTOMATED: CPT | Performed by: INTERNAL MEDICINE

## 2022-08-30 PROCEDURE — 83036 HEMOGLOBIN GLYCOSYLATED A1C: CPT | Performed by: INTERNAL MEDICINE

## 2022-08-30 PROCEDURE — 99232 SBSQ HOSP IP/OBS MODERATE 35: CPT

## 2022-08-30 PROCEDURE — A9577 INJ MULTIHANCE: HCPCS | Performed by: INTERNAL MEDICINE

## 2022-08-30 PROCEDURE — 36415 COLL VENOUS BLD VENIPUNCTURE: CPT | Performed by: INTERNAL MEDICINE

## 2022-08-30 PROCEDURE — 80048 BASIC METABOLIC PNL TOTAL CA: CPT | Performed by: INTERNAL MEDICINE

## 2022-08-30 PROCEDURE — 63710000001 DEXAMETHASONE PER 0.25 MG: Performed by: NURSE PRACTITIONER

## 2022-08-30 PROCEDURE — 0 GADOBENATE DIMEGLUMINE 529 MG/ML SOLUTION: Performed by: INTERNAL MEDICINE

## 2022-08-30 PROCEDURE — 96376 TX/PRO/DX INJ SAME DRUG ADON: CPT

## 2022-08-30 PROCEDURE — 63710000001 INSULIN LISPRO (HUMAN) PER 5 UNITS: Performed by: INTERNAL MEDICINE

## 2022-08-30 PROCEDURE — 99203 OFFICE O/P NEW LOW 30 MIN: CPT | Performed by: PHYSICIAN ASSISTANT

## 2022-08-30 PROCEDURE — 25010000002 LEVETRIRACETAM PER 10 MG: Performed by: NURSE PRACTITIONER

## 2022-08-30 RX ORDER — BUTALBITAL, ACETAMINOPHEN AND CAFFEINE 50; 325; 40 MG/1; MG/1; MG/1
1 TABLET ORAL EVERY 4 HOURS PRN
Qty: 20 TABLET | Refills: 0 | Status: SHIPPED | OUTPATIENT
Start: 2022-08-30 | End: 2022-09-21

## 2022-08-30 RX ORDER — FAMOTIDINE 20 MG/1
20 TABLET, FILM COATED ORAL 2 TIMES DAILY
Qty: 30 TABLET | Refills: 0 | Status: SHIPPED | OUTPATIENT
Start: 2022-08-30 | End: 2022-08-30 | Stop reason: SDUPTHER

## 2022-08-30 RX ORDER — LEVETIRACETAM 500 MG/1
500 TABLET ORAL 2 TIMES DAILY
Qty: 60 TABLET | Refills: 0 | Status: SHIPPED | OUTPATIENT
Start: 2022-08-30 | End: 2022-08-30 | Stop reason: SDUPTHER

## 2022-08-30 RX ORDER — LEVETIRACETAM 500 MG/1
500 TABLET ORAL 2 TIMES DAILY
Qty: 60 TABLET | Refills: 0 | Status: SHIPPED | OUTPATIENT
Start: 2022-08-30 | End: 2022-08-31 | Stop reason: SDUPTHER

## 2022-08-30 RX ORDER — FAMOTIDINE 20 MG/1
20 TABLET, FILM COATED ORAL 2 TIMES DAILY
Qty: 30 TABLET | Refills: 0 | Status: SHIPPED | OUTPATIENT
Start: 2022-08-30 | End: 2022-08-31 | Stop reason: SDUPTHER

## 2022-08-30 RX ORDER — LEVETIRACETAM 500 MG/1
500 TABLET ORAL EVERY 12 HOURS SCHEDULED
Status: DISCONTINUED | OUTPATIENT
Start: 2022-08-30 | End: 2022-08-31 | Stop reason: HOSPADM

## 2022-08-30 RX ORDER — DEXAMETHASONE 4 MG/1
4 TABLET ORAL EVERY 8 HOURS
Qty: 30 TABLET | Refills: 0 | Status: SHIPPED | OUTPATIENT
Start: 2022-08-30 | End: 2022-08-30 | Stop reason: SDUPTHER

## 2022-08-30 RX ORDER — FAMOTIDINE 20 MG/1
20 TABLET, FILM COATED ORAL
Status: DISCONTINUED | OUTPATIENT
Start: 2022-08-31 | End: 2022-08-31 | Stop reason: HOSPADM

## 2022-08-30 RX ORDER — SODIUM CHLORIDE 0.9 % (FLUSH) 0.9 %
10 SYRINGE (ML) INJECTION AS NEEDED
Status: DISCONTINUED | OUTPATIENT
Start: 2022-08-30 | End: 2022-08-31 | Stop reason: HOSPADM

## 2022-08-30 RX ORDER — DEXAMETHASONE 4 MG/1
4 TABLET ORAL EVERY 8 HOURS
Qty: 30 TABLET | Refills: 0 | Status: SHIPPED | OUTPATIENT
Start: 2022-08-30 | End: 2022-12-12 | Stop reason: SDUPTHER

## 2022-08-30 RX ORDER — DEXAMETHASONE 4 MG/1
4 TABLET ORAL EVERY 8 HOURS SCHEDULED
Status: DISCONTINUED | OUTPATIENT
Start: 2022-08-30 | End: 2022-08-31 | Stop reason: HOSPADM

## 2022-08-30 RX ORDER — DEXAMETHASONE 4 MG/1
4 TABLET ORAL 2 TIMES DAILY WITH MEALS
Qty: 30 TABLET | Refills: 0 | Status: SHIPPED | OUTPATIENT
Start: 2022-08-30 | End: 2022-08-30 | Stop reason: SDUPTHER

## 2022-08-30 RX ORDER — SODIUM CHLORIDE 0.9 % (FLUSH) 0.9 %
10 SYRINGE (ML) INJECTION EVERY 12 HOURS SCHEDULED
Status: DISCONTINUED | OUTPATIENT
Start: 2022-08-30 | End: 2022-08-31 | Stop reason: HOSPADM

## 2022-08-30 RX ORDER — BUTALBITAL, ACETAMINOPHEN AND CAFFEINE 50; 325; 40 MG/1; MG/1; MG/1
1 TABLET ORAL EVERY 4 HOURS PRN
Qty: 20 TABLET | Refills: 0 | Status: SHIPPED | OUTPATIENT
Start: 2022-08-30 | End: 2022-08-30 | Stop reason: SDUPTHER

## 2022-08-30 RX ORDER — CEFAZOLIN SODIUM 2 G/100ML
2 INJECTION, SOLUTION INTRAVENOUS ONCE
Status: CANCELLED | OUTPATIENT
Start: 2022-09-06

## 2022-08-30 RX ORDER — DEXAMETHASONE SODIUM PHOSPHATE 4 MG/ML
4 INJECTION, SOLUTION INTRA-ARTICULAR; INTRALESIONAL; INTRAMUSCULAR; INTRAVENOUS; SOFT TISSUE EVERY 8 HOURS
Status: DISCONTINUED | OUTPATIENT
Start: 2022-08-31 | End: 2022-08-30

## 2022-08-30 RX ADMIN — DEXAMETHASONE SODIUM PHOSPHATE 4 MG: 4 INJECTION, SOLUTION INTRAMUSCULAR; INTRAVENOUS at 12:46

## 2022-08-30 RX ADMIN — GADOBENATE DIMEGLUMINE 18 ML: 529 INJECTION, SOLUTION INTRAVENOUS at 09:20

## 2022-08-30 RX ADMIN — ACETAMINOPHEN 650 MG: 325 TABLET, FILM COATED ORAL at 01:36

## 2022-08-30 RX ADMIN — DEXAMETHASONE 4 MG: 4 TABLET ORAL at 22:52

## 2022-08-30 RX ADMIN — INSULIN LISPRO 4 UNITS: 100 INJECTION, SOLUTION INTRAVENOUS; SUBCUTANEOUS at 16:04

## 2022-08-30 RX ADMIN — DEXAMETHASONE SODIUM PHOSPHATE 4 MG: 4 INJECTION, SOLUTION INTRAMUSCULAR; INTRAVENOUS at 04:50

## 2022-08-30 RX ADMIN — LEVETIRACETAM 500 MG: 100 INJECTION INTRAVENOUS at 12:46

## 2022-08-30 RX ADMIN — LEVETIRACETAM 500 MG: 500 TABLET, FILM COATED ORAL at 22:52

## 2022-08-30 RX ADMIN — ACETAMINOPHEN 650 MG: 325 TABLET, FILM COATED ORAL at 16:01

## 2022-08-31 ENCOUNTER — APPOINTMENT (OUTPATIENT)
Dept: NEUROLOGY | Facility: HOSPITAL | Age: 62
End: 2022-08-31

## 2022-08-31 ENCOUNTER — READMISSION MANAGEMENT (OUTPATIENT)
Dept: CALL CENTER | Facility: HOSPITAL | Age: 62
End: 2022-08-31

## 2022-08-31 VITALS
RESPIRATION RATE: 16 BRPM | WEIGHT: 190.92 LBS | SYSTOLIC BLOOD PRESSURE: 124 MMHG | HEART RATE: 65 BPM | HEIGHT: 67 IN | TEMPERATURE: 97.9 F | DIASTOLIC BLOOD PRESSURE: 84 MMHG | OXYGEN SATURATION: 96 % | BODY MASS INDEX: 29.97 KG/M2

## 2022-08-31 LAB
ALBUMIN SERPL-MCNC: 3.9 G/DL (ref 3.5–5.2)
ANION GAP SERPL CALCULATED.3IONS-SCNC: 10.4 MMOL/L (ref 5–15)
BUN SERPL-MCNC: 14 MG/DL (ref 8–23)
BUN/CREAT SERPL: 21.2 (ref 7–25)
CALCIUM SPEC-SCNC: 9.2 MG/DL (ref 8.6–10.5)
CHLORIDE SERPL-SCNC: 108 MMOL/L (ref 98–107)
CO2 SERPL-SCNC: 23.6 MMOL/L (ref 22–29)
CREAT SERPL-MCNC: 0.66 MG/DL (ref 0.57–1)
DEPRECATED RDW RBC AUTO: 42.9 FL (ref 37–54)
EGFRCR SERPLBLD CKD-EPI 2021: 99.9 ML/MIN/1.73
ERYTHROCYTE [DISTWIDTH] IN BLOOD BY AUTOMATED COUNT: 13 % (ref 12.3–15.4)
GLUCOSE BLDC GLUCOMTR-MCNC: 128 MG/DL (ref 70–130)
GLUCOSE BLDC GLUCOMTR-MCNC: 128 MG/DL (ref 70–130)
GLUCOSE BLDC GLUCOMTR-MCNC: 259 MG/DL (ref 70–130)
GLUCOSE SERPL-MCNC: 143 MG/DL (ref 65–99)
HCT VFR BLD AUTO: 39.1 % (ref 34–46.6)
HGB BLD-MCNC: 12.9 G/DL (ref 12–15.9)
MCH RBC QN AUTO: 29.9 PG (ref 26.6–33)
MCHC RBC AUTO-ENTMCNC: 33 G/DL (ref 31.5–35.7)
MCV RBC AUTO: 90.5 FL (ref 79–97)
PHOSPHATE SERPL-MCNC: 3.3 MG/DL (ref 2.5–4.5)
PLATELET # BLD AUTO: 234 10*3/MM3 (ref 140–450)
PMV BLD AUTO: 10.1 FL (ref 6–12)
POTASSIUM SERPL-SCNC: 4.3 MMOL/L (ref 3.5–5.2)
RBC # BLD AUTO: 4.32 10*6/MM3 (ref 3.77–5.28)
SODIUM SERPL-SCNC: 142 MMOL/L (ref 136–145)
WBC NRBC COR # BLD: 12.2 10*3/MM3 (ref 3.4–10.8)

## 2022-08-31 PROCEDURE — 99214 OFFICE O/P EST MOD 30 MIN: CPT | Performed by: PHYSICIAN ASSISTANT

## 2022-08-31 PROCEDURE — G0378 HOSPITAL OBSERVATION PER HR: HCPCS

## 2022-08-31 PROCEDURE — 95819 EEG AWAKE AND ASLEEP: CPT

## 2022-08-31 PROCEDURE — 85027 COMPLETE CBC AUTOMATED: CPT | Performed by: INTERNAL MEDICINE

## 2022-08-31 PROCEDURE — 63710000001 DEXAMETHASONE PER 0.25 MG: Performed by: NURSE PRACTITIONER

## 2022-08-31 PROCEDURE — 63710000001 INSULIN LISPRO (HUMAN) PER 5 UNITS: Performed by: INTERNAL MEDICINE

## 2022-08-31 PROCEDURE — 95819 EEG AWAKE AND ASLEEP: CPT | Performed by: PSYCHIATRY & NEUROLOGY

## 2022-08-31 PROCEDURE — 80069 RENAL FUNCTION PANEL: CPT | Performed by: INTERNAL MEDICINE

## 2022-08-31 PROCEDURE — 82962 GLUCOSE BLOOD TEST: CPT

## 2022-08-31 PROCEDURE — 99214 OFFICE O/P EST MOD 30 MIN: CPT | Performed by: NEUROLOGICAL SURGERY

## 2022-08-31 RX ORDER — ALPRAZOLAM 0.25 MG/1
0.25 TABLET ORAL NIGHTLY PRN
Qty: 6 TABLET | Refills: 0 | Status: SHIPPED | OUTPATIENT
Start: 2022-08-31 | End: 2022-09-21

## 2022-08-31 RX ORDER — FAMOTIDINE 20 MG/1
20 TABLET, FILM COATED ORAL 2 TIMES DAILY
Qty: 30 TABLET | Refills: 0 | Status: SHIPPED | OUTPATIENT
Start: 2022-08-31

## 2022-08-31 RX ORDER — LEVETIRACETAM 500 MG/1
500 TABLET ORAL 2 TIMES DAILY
Qty: 60 TABLET | Refills: 0 | Status: SHIPPED | OUTPATIENT
Start: 2022-08-31

## 2022-08-31 RX ADMIN — ACETAMINOPHEN 650 MG: 325 TABLET, FILM COATED ORAL at 15:41

## 2022-08-31 RX ADMIN — LEVETIRACETAM 500 MG: 500 TABLET, FILM COATED ORAL at 09:04

## 2022-08-31 RX ADMIN — INSULIN LISPRO 6 UNITS: 100 INJECTION, SOLUTION INTRAVENOUS; SUBCUTANEOUS at 13:02

## 2022-08-31 RX ADMIN — DEXAMETHASONE 4 MG: 4 TABLET ORAL at 14:28

## 2022-08-31 RX ADMIN — PRAVASTATIN SODIUM 20 MG: 20 TABLET ORAL at 09:04

## 2022-08-31 RX ADMIN — DEXAMETHASONE 4 MG: 4 TABLET ORAL at 06:34

## 2022-08-31 RX ADMIN — FAMOTIDINE 20 MG: 20 TABLET ORAL at 06:34

## 2022-09-01 ENCOUNTER — TELEPHONE (OUTPATIENT)
Dept: NEUROLOGY | Facility: CLINIC | Age: 62
End: 2022-09-01

## 2022-09-01 ENCOUNTER — TRANSITIONAL CARE MANAGEMENT TELEPHONE ENCOUNTER (OUTPATIENT)
Dept: CALL CENTER | Facility: HOSPITAL | Age: 62
End: 2022-09-01

## 2022-09-01 ENCOUNTER — TELEPHONE (OUTPATIENT)
Dept: NEUROSURGERY | Facility: CLINIC | Age: 62
End: 2022-09-01

## 2022-09-01 NOTE — OUTREACH NOTE
Call Center TCM Note    Flowsheet Row Responses   [facility] patient discharged from? [facility]   Does the patient have one of the following disease processes/diagnoses(primary or secondary)? Other   TCM attempt successful? No   Unsuccessful attempts Attempt 1          Rolanda Pierson MA    9/1/2022, 15:41 EDT

## 2022-09-01 NOTE — TELEPHONE ENCOUNTER
Caller: Ai Carreon    Relationship: Self    Best call back number: [phone]    What test was performed: EEG    When was the test performed: 8/31/2022    Where was the test performed: [facility]    Additional notes: PT, PT'S DAUGHTER, AND PT'S SPOUSE CALLING FOR EEG RESULTS. I ADVISED THAT AS SOON AS PROVIDER IS ABLE TO REVIEW THE RESULTS, A CLINICAL STAFF MEMBER WOULD BE REACHING OUT TO GO OVER RESULTS WITH THEM.    PLEASE REVIEW AND ADVISE.

## 2022-09-01 NOTE — OUTREACH NOTE
Call Center TCM Note    Flowsheet Row Responses   [facility] patient discharged from? [place]   Does the patient have one of the following disease processes/diagnoses(primary or secondary)? Other   TCM attempt successful? No   Unsuccessful attempts Attempt 2          Brandon Seay RN    9/1/2022, 16:57 EDT

## 2022-09-01 NOTE — TELEPHONE ENCOUNTER
I S/W PT'S SPOUSE AND GAVE HIM SURGERY DATE OF 9/6/22 1PM CASE WITH ARRIVAL TIME OF 11AM. PT ADVISED PER PAT APRN SHE DOES NOT NEED TO HAVE PAT. PT'S SPOUSE VOICED UNDERSTANDING OF ALL INFORMATION.

## 2022-09-01 NOTE — TELEPHONE ENCOUNTER
I called and s/w spouse to let him know that the EEG results come from Neurology and that yBron, our  would be calling him later with surgery information.

## 2022-09-01 NOTE — TELEPHONE ENCOUNTER
Patient called wanting results of EEG and also needing Procedure time for biopsy of tumor. Patient just got out of hospital would like her  to be called with information. Please call [phone].

## 2022-09-02 ENCOUNTER — TELEPHONE (OUTPATIENT)
Dept: FAMILY MEDICINE CLINIC | Facility: CLINIC | Age: 62
End: 2022-09-02

## 2022-09-02 ENCOUNTER — TRANSITIONAL CARE MANAGEMENT TELEPHONE ENCOUNTER (OUTPATIENT)
Dept: CALL CENTER | Facility: HOSPITAL | Age: 62
End: 2022-09-02

## 2022-09-02 DIAGNOSIS — E11.9 TYPE 2 DIABETES MELLITUS WITHOUT COMPLICATION, WITHOUT LONG-TERM CURRENT USE OF INSULIN: ICD-10-CM

## 2022-09-02 NOTE — PAYOR COMM NOTE
"Laura Carreon (61 y.o. Female)     DC SUMMARY FOR [id number]    PLEASE CANCEL THE INPATIENT REQUEST.  PATIENT WAS CHANGED TO OBSERVATION AND DC'D HOME. SHE AND FAMILY WANTED TO TALK OVER AND  THINK ABOUT OPTIONS BEFORE AGREEING TO SURGERY.      CONTACT MACK THORNE.  P# [phone]  F# [phone]            Date of Birth   1960    Social Security Number       Address   [address] [place] [address]    Home Phone   [phone]    MRN   [id number]       [facility]    Marital Status                               Admission Date   8/29/22    Admission Type   Emergency    Admitting Provider   Sabine Aguirre MD    Attending Provider       Department, Room/Bed   [address], P599/1       Discharge Date   8/31/2022    Discharge Disposition   Home or Self Care    Discharge Destination                               Attending Provider: (none)   Allergies: No Known Allergies    Isolation: None   Infection: None   Code Status: Prior   Advance Care Planning Activity    Ht: 170.2 cm (67\")   Wt: 86.6 kg (190 lb 14.7 oz)    Admission Cmt: None   Principal Problem: None                Active Insurance as of 8/29/2022     Primary Coverage     Payor Plan Insurance Group Employer/Plan Group    [employer]HEM BLUE CROSS ANTHEM BLUE CROSS BLUE SHIELD PPO [id number]     Payor Plan Address Payor Plan Phone Number Payor Plan Fax Number Effective Dates    [address] [phone]  1/1/2022 - None Entered    [place] [address]       Subscriber Name Subscriber Birth Date Member ID       LAURA CARREON 1960 [id number]                 Emergency Contacts      (Rel.) Home Phone Work Phone Mobile Phone    Landry Carreon (Spouse) [phone] -- --    AVERY FARMER (Daughter) -- -- [phone]               Discharge Summary      Shola Hernandez, DO at 08/30/22 1710              [facility]  DISCHARGE SUMMARY      Name:  Laura Carreon   Age:  61 " y.o.  Sex:  female  :  1960  MRN:  [id number]   Visit Number:  [id number]  Primary Care Physician:  Shanelle Mathis MD  Date of Discharge:  2022  Admission Date:  2022      Discharge Diagnosis:     1.  Frontal lobe mass of brain suspected to be glioblastoma, less likely lymphoma  2.  Vasogenic edema  3.  Diabetes mellitus type 2  4.  Seizure prophylaxis  Active Hospital Problems    Diagnosis  POA   • Frontal mass of brain [G93.89]  Yes   • Vasogenic edema (HCC) [G93.6]  Yes   • Brain tumor (HCC) [D49.6]  Yes      Resolved Hospital Problems   No resolved problems to display.         Presenting Problem/History of Present Illness:    Brain tumor (HCC) [D49.6]  Intermittent confusion [R41.0]         Hospital Course:    61-year-old female who came in with headaches and confusion the last 3 weeks.  She has a history of diabetes mellitus type 2, hyperlipidemia.  CT of the brain suggested mass.  She was admitted and neurosurgery was consulted.  She was placed on Decadron and Keppra for seizure prophylaxis.  MRI was done, findings were consistent with glioblastoma multiforme in the right frontal lobe, with surrounding vasogenic edema.  Neurosurgery plans biopsy on Thursday.     Patient currently denies any chest pressure, shortness of breath, nausea, vomiting, pain.   and 3 children were present.  Answered all questions of them.     CT of chest abdomen pelvis were all negative for any masses or any pathology.    Neurosurgery apparently cannot biopsy until next week now.  They recommended continuing with Decadron 4 mg every 8 hours, Pepcid 20 mg twice daily, and Keppra 500 mg twice daily until then.  Patient will be discharged.  Follow-up with neurosurgery as scheduled.  Follow-up with PCP next week.  Return to hospital if any worsening symptoms of pain, confusion.    Procedures Performed:    Procedure(s):  RIGHT STEREOTACTIC BRAIN BIOPSY WITH STEALTH       Consults:     Consults     Date and Time  "Order Name Status Description    8/29/2022  4:44 PM Inpatient Neurology Consult General      8/29/2022  4:29 PM [facility] (on-call MD unless specified) Details      8/29/2022  4:12 PM Neurosurgery (on-call MD unless specified) Completed           Pertinent Test Results:         Results from last 7 days   Lab Units 08/30/22  0656 08/29/22  1425   WBC 10*3/mm3 7.31 6.90   HEMOGLOBIN g/dL 13.3 13.8   HEMATOCRIT % 40.8 40.7   PLATELETS 10*3/mm3 226 249   MCV fL 91.1 87.9   SODIUM mmol/L 140 144   POTASSIUM mmol/L 4.1 4.5   CHLORIDE mmol/L 104 104   CO2 mmol/L 25.0 25.1   BUN mg/dL 10 10   CREATININE mg/dL 0.60 0.71   GLUCOSE mg/dL 164* 117*   CALCIUM mg/dL 9.2 9.3        Results from last 7 days   Lab Units 08/29/22  1425   TROPONIN T ng/mL <0.010     Results from last 7 days   Lab Units 08/30/22  0656 08/29/22  1425   WBC 10*3/mm3 7.31 6.90     Results from last 7 days   Lab Units 08/29/22  1425   BILIRUBIN mg/dL 0.2   ALK PHOS U/L 88   ALT (SGPT) U/L 13   AST (SGOT) U/L 14   PROTIME Seconds 13.4   INR  1.03   APTT seconds 33.5           Invalid input(s): TG, LDLCALC, LDLREALC  Results from last 7 days   Lab Units 08/30/22  0656   HEMOGLOBIN A1C % 6.10*     Brief Urine Lab Results  (Last result in the past 365 days)      Color   Clarity   Blood   Leuk Est   Nitrite   Protein   CREAT   Urine HCG        08/29/22 1452 Yellow   Clear   Negative   Negative   Negative   Negative                             Condition on Discharge:      Guarded prognosis    Vital Signs:    /68 (BP Location: Right arm, Patient Position: Lying)   Pulse 67   Temp 98 °F (36.7 °C) (Oral)   Resp 16   Ht 170.2 cm (67\")   Wt 86.6 kg (191 lb 0.1 oz)   SpO2 94%   BMI 29.92 kg/m²     Physical Exam:    General: Alert and oriented x4, mild distress.  Heart: Regular rate and rhythm without murmur rub or thrill.  Lungs: Clear to auscultation bilaterally without use of accessory muscles respiration.  Abdomen: Soft/nontender/nondistended.  No HSM " noted.  MSK: 5/5 strength in upper/lower extremities bilaterally.    Discharge Disposition:    Home or Self Care    Discharge Medication:       Discharge Medications      New Medications      Instructions Start Date   butalbital-acetaminophen-caffeine -40 MG per tablet  Commonly known as: Esgic   1 tablet, Oral, Every 4 Hours PRN      dexamethasone 4 MG tablet  Commonly known as: DECADRON   4 mg, Oral, Every 8 Hours      famotidine 20 MG tablet  Commonly known as: Pepcid   20 mg, Oral, 2 Times Daily      levETIRAcetam 500 MG tablet  Commonly known as: Keppra   500 mg, Oral, 2 Times Daily         Continue These Medications      Instructions Start Date   metFORMIN  MG 24 hr tablet  Commonly known as: GLUCOPHAGE-XR   1,000 mg, Oral, 2 Times Daily      pravastatin 20 MG tablet  Commonly known as: Pravachol   20 mg, Oral, Daily             Discharge Diet:     Diet Instructions     Diet: Regular, Cardiac      Discharge Diet:  Regular  Cardiac             Activity at Discharge:     Activity Instructions     Activity as Tolerated      Driving Restrictions      Type of Restriction: Driving    Driving Restrictions: No Driving          Follow-up Appointments:    Future Appointments   Date Time Provider Department Center   8/31/2022 10:05 AM  [facility] EEG VIDEO  [facility]O [facility]   9/14/2022  8:40 AM MAMMOGRAM PI LAURITA K PI DUP [facility]   9/14/2022  9:00 AM Sweetie Norman MD [facility] DUP [facility]   12/12/2022  9:00 AM Shanelle Mathis MD [facility]3 [facility]     Additional Instructions for the Follow-ups that You Need to Schedule     Discharge Follow-up with PCP   As directed       Currently Documented PCP:    Shanelle Mathis MD    PCP Phone Number:    [phone]     Follow Up Details: 1 week         Discharge Follow-up with Specified Provider: neurosurgery as scheduled   As directed      To: neurosurgery as scheduled               Test Results Pending at Discharge:           Shola Hernandez,    08/30/22  18:09 EDT    Electronically signed by Shola Hernandez DO at 08/30/22 1809     Casey Mariee MD at 08/31/22 1102            Date of Admission: 8/29/2022  Date of Discharge:  8/31/2022  Primary Care Physician: Shanelle Mathis, MD     Discharge Diagnosis:  Active Hospital Problems    Diagnosis  POA   • Frontal mass of brain [G93.89]  Yes   • Vasogenic edema (HCC) [G93.6]  Yes   • Brain tumor (HCC) [D49.6]  Yes   • Type 2 diabetes mellitus without complication, without long-term current use of insulin (HCC) [E11.9]  Yes      Resolved Hospital Problems   No resolved problems to display.       DETAILS OF HOSPITAL STAY     Pertinent Test Results and Procedures Performed    Brain MRI:  1. The findings are most consistent with a glioblastoma multiforme with   multifocal enhancing high-grade tumor involving the superior medial   right frontal lobe with the largest focus of enhancing high-grade tumor   demonstrating central necrosis and it tracks across the anterior body of   the corpus callosum into the left frontal periventricular white matter,   I think it is much less likely primary CNS lymphoma. The largest   enhancing centrally necrotic mass tracks from the superior medial right   frontal lobe across the anterior body of the corpus callosum into the   medial left frontal periventricular white matter and this enhancing   centrally necrotic lesion measures 4.7 x 2.9 x 2.7 cm. There are   multiple additional adjacent areas of enhancing high-grade tumor in the   superior right frontal lobe parenchyma and then there is an additional   enhancing centrally necrotic mass extending posterior to the dominant   mass within the anterior body of the corpus callosum this adjacent   second corpus callosal mass is located within the central body of the   corpus callosum that extends eccentrically into the posterior superior   medial left frontal periventricular white matter and this enhancing    centrally necrotic mass measures up to 3.6 x 1.5 x 1.6 cm. There is   exuberant surrounding FLAIR and T2 hyperintensity tracking throughout   nearly the entire superior right frontal white matter up to 7.5 x 5 x 5   cm that is felt to be surrounding vasogenic edema or could be   infiltrating areas of nonenhancing tumor or combination of above and   there is some FLAIR and T2 high signal along the margins of the   component that extends in the posterior superior medial left frontal   white matter that tracks up to 2.8 x 1.9 cm that is either surrounding   edema or infiltrating nonenhancing tumor. There is mass effect on the   frontal horns and anterior bodies of lateral ventricles and due to the   asymmetric involvement of the superior right frontal lobe there is   asymmetric mass effect and there is 4-5 mm right-to-left midline shift   at the level of the mid to superior falx cerebri.    CT chest, abdomen, and pelvis showed no acute process or evidence of neoplasm    Hospital Course  This is a pleasant 61-year-old female who came in with headaches and confusion the last 3 weeks.  Please see H&P for full details.  She has a history of diabetes mellitus type 2, hyperlipidemia.  CT of the brain suggested mass.  She was admitted and neurosurgery was consulted.  She was placed on Decadron and Keppra for seizure prophylaxis.  MRI was done, findings were consistent with glioblastoma multiforme in the right frontal lobe, with surrounding vasogenic edema.  Neurosurgery was consulted and are planning for biopsy on Tuesday and if cleared the patient to be discharged home with recommendations for oral Decadron, Pepcid, and Keppra until the time of surgery.  Her confusion is improved along with her headaches.  Neurology was consulted and are performing the EEG today.  Thereafter she can be discharged with plans to return next week after the holiday weekend for the brain biopsy.  She will be referred to medical oncology pending  results of biopsy.    Physical Exam at Discharge:  General: No acute distress, AAOx3  HEENT: EOMI, PERRL  Cardiovascular: +s1 and s2, RRR  Lungs: No rhonchi or wheezing  Abdomen: soft, nontender    Consults:   Consults     Date and Time Order Name Status Description    8/31/2022  9:20 AM Inpatient Medical Oncology Consult      8/29/2022  4:44 PM Inpatient Neurology Consult General      8/29/2022  4:29 PM [facility] (on-call MD unless specified) Details      8/29/2022  4:12 PM Neurosurgery (on-call MD unless specified) Completed             Condition on Discharge: Stable    Discharge Disposition  Home or Self Care    Discharge Medications     Discharge Medications      New Medications      Instructions Start Date   ALPRAZolam 0.25 MG tablet  Commonly known as: Xanax   0.25 mg, Oral, Nightly PRN      butalbital-acetaminophen-caffeine -40 MG per tablet  Commonly known as: Esgic   1 tablet, Oral, Every 4 Hours PRN      dexamethasone 4 MG tablet  Commonly known as: DECADRON   4 mg, Oral, Every 8 Hours      famotidine 20 MG tablet  Commonly known as: Pepcid   20 mg, Oral, 2 Times Daily      levETIRAcetam 500 MG tablet  Commonly known as: Keppra   500 mg, Oral, 2 Times Daily         Continue These Medications      Instructions Start Date   metFORMIN  MG 24 hr tablet  Commonly known as: GLUCOPHAGE-XR   1,000 mg, Oral, 2 Times Daily      pravastatin 20 MG tablet  Commonly known as: Pravachol   20 mg, Oral, Daily             Discharge Diet:   Diet Instructions     Diet: Regular, Cardiac      Discharge Diet:  Regular  Cardiac       Diet: Regular, Cardiac, Consistent Carbohydrate      Discharge Diet:  Regular  Cardiac  Consistent Carbohydrate             Activity at Discharge:   Activity Instructions     Activity as Tolerated      Activity as Tolerated      Driving Restrictions      Type of Restriction: Driving    Driving Restrictions: No Driving          Follow-up Appointments  Future Appointments   Date Time Provider  Department Center   9/14/2022  8:40 AM MAMMOGRAM PIWH LAURITA [facility] DUP [facility]   9/14/2022  9:00 AM Sweetie Norman MD [facility] DUP [facility]   12/12/2022  9:00 AM Shanelle Mathis MD [facility]     Additional Instructions for the Follow-ups that You Need to Schedule     Discharge Follow-up with PCP   As directed       Currently Documented PCP:    Shanelle Mathis MD    PCP Phone Number:    [phone]     Follow Up Details: 1 week         Discharge Follow-up with Specialty: Neurosurgery on 9/6/22   As directed      Specialty: Neurosurgery on 9/6/22         Discharge Follow-up with Specified Provider: neurosurgery as scheduled   As directed      To: neurosurgery as scheduled               I have examined and discussed discharge planning with the patient today.    I wore full protective equipment throughout the patient encounter including eye protection and facemask.  Hand hygiene was performed before donning protective equipment and after removal when leaving the room.     Casey Mariee MD  08/31/22  11:02 EDT    Time: Discharge greater than 30 min            Electronically signed by Casey Mariee MD at 08/31/22 6925

## 2022-09-02 NOTE — TELEPHONE ENCOUNTER
Called  and daughter back.  Patient is now on Decadron and been off of metformin for several days and just restarted 24 hours a day ago.  She is been running high blood sugars in 200s and high 100s.  This time recommended to just observe and continue metformin until the next procedure where she unfortunately will have to be taken off of metformin again lots of water and very strict low sugar low-carb diet.  Call with any questions.  Daughter verbalized understanding

## 2022-09-02 NOTE — TELEPHONE ENCOUNTER
Patient's  and daughter concerned about her blood sugar and have questions about her after care from the hospital.

## 2022-09-02 NOTE — OUTREACH NOTE
Call Center TCM Note    Flowsheet Row Responses   [facility] patient discharged from? [place]   Does the patient have one of the following disease processes/diagnoses(primary or secondary)? Other   TCM attempt successful? Yes   Call start time 1505   Call end time 1507   Discharge diagnosis Frontal mass of brain   Is patient permission given to speak with other caregiver? Yes   List who call center can speak with NORRISALESSIO MOTT   Person spoke with today (if not patient) and relationship MYRNA MOTT-    Meds reviewed with patient/caregiver? Yes   Is the patient having any side effects they believe may be caused by any medication additions or changes? No   Does the patient have all medications ordered at discharge? Yes   Is the patient taking all medications as directed (includes completed medication regime)? Yes   Does the patient have an appointment with their PCP within 7 days of discharge? No   Nursing Interventions Assisted patient with making appointment per protocol   Has home health visited the patient within 72 hours of discharge? N/A   Psychosocial issues? No   Did the patient receive a copy of their discharge instructions? Yes   Nursing interventions Reviewed instructions with patient   What is the patient's perception of their health status since discharge? Same   Is the patient/caregiver able to teach back signs and symptoms related to disease process for when to call PCP? Yes   Is the patient/caregiver able to teach back signs and symptoms related to disease process for when to call 911? Yes   Is the patient/caregiver able to teach back the hierarchy of who to call/visit for symptoms/problems? PCP, Specialist, Home health nurse, Urgent Care, ED, 911 Yes   If the patient is a current smoker, are they able to teach back resources for cessation? Not a smoker   TCM call completed? Yes          Tiffanie Rodriguez LPN    9/2/2022, 15:07 EDT

## 2022-09-06 ENCOUNTER — HOSPITAL ENCOUNTER (INPATIENT)
Facility: HOSPITAL | Age: 62
LOS: 1 days | Discharge: HOME | End: 2022-09-07
Attending: NEUROLOGICAL SURGERY | Admitting: NEUROLOGICAL SURGERY

## 2022-09-06 ENCOUNTER — ANESTHESIA EVENT (OUTPATIENT)
Dept: PERIOP | Facility: HOSPITAL | Age: 62
End: 2022-09-06

## 2022-09-06 ENCOUNTER — ANESTHESIA (OUTPATIENT)
Dept: PERIOP | Facility: HOSPITAL | Age: 62
End: 2022-09-06

## 2022-09-06 ENCOUNTER — TELEPHONE (OUTPATIENT)
Dept: NEUROLOGY | Facility: CLINIC | Age: 62
End: 2022-09-06

## 2022-09-06 DIAGNOSIS — D49.6 BRAIN TUMOR: ICD-10-CM

## 2022-09-06 LAB
GLUCOSE BLDC GLUCOMTR-MCNC: 135 MG/DL (ref 70–130)
GLUCOSE BLDC GLUCOMTR-MCNC: 137 MG/DL (ref 70–130)
GLUCOSE BLDC GLUCOMTR-MCNC: 196 MG/DL (ref 70–130)

## 2022-09-06 PROCEDURE — 81287 MGMT GENE PRMTR MTHYLTN ALYS: CPT

## 2022-09-06 PROCEDURE — 25010000002 CEFAZOLIN IN DEXTROSE 2-4 GM/100ML-% SOLUTION: Performed by: NEUROLOGICAL SURGERY

## 2022-09-06 PROCEDURE — 88331 PATH CONSLTJ SURG 1 BLK 1SPC: CPT | Performed by: NEUROLOGICAL SURGERY

## 2022-09-06 PROCEDURE — C1713 ANCHOR/SCREW BN/BN,TIS/BN: HCPCS | Performed by: NEUROLOGICAL SURGERY

## 2022-09-06 PROCEDURE — 61750 INCISE SKULL/BRAIN BIOPSY: CPT | Performed by: NEUROLOGICAL SURGERY

## 2022-09-06 PROCEDURE — 88341 IMHCHEM/IMCYTCHM EA ADD ANTB: CPT | Performed by: NEUROLOGICAL SURGERY

## 2022-09-06 PROCEDURE — 00B03ZX EXCISION OF BRAIN, PERCUTANEOUS APPROACH, DIAGNOSTIC: ICD-10-PCS | Performed by: NEUROLOGICAL SURGERY

## 2022-09-06 PROCEDURE — 25010000002 FENTANYL CITRATE (PF) 50 MCG/ML SOLUTION: Performed by: ANESTHESIOLOGY

## 2022-09-06 PROCEDURE — 82962 GLUCOSE BLOOD TEST: CPT

## 2022-09-06 PROCEDURE — 25010000002 MIDAZOLAM PER 1 MG: Performed by: ANESTHESIOLOGY

## 2022-09-06 PROCEDURE — 63710000001 DEXAMETHASONE PER 0.25 MG: Performed by: NEUROLOGICAL SURGERY

## 2022-09-06 PROCEDURE — C1889 IMPLANT/INSERT DEVICE, NOC: HCPCS | Performed by: NEUROLOGICAL SURGERY

## 2022-09-06 PROCEDURE — 25010000002 PROPOFOL 10 MG/ML EMULSION: Performed by: ANESTHESIOLOGY

## 2022-09-06 PROCEDURE — 25010000002 DEXAMETHASONE PER 1 MG: Performed by: ANESTHESIOLOGY

## 2022-09-06 PROCEDURE — 88307 TISSUE EXAM BY PATHOLOGIST: CPT | Performed by: NEUROLOGICAL SURGERY

## 2022-09-06 PROCEDURE — 88360 TUMOR IMMUNOHISTOCHEM/MANUAL: CPT | Performed by: NEUROLOGICAL SURGERY

## 2022-09-06 PROCEDURE — 88342 IMHCHEM/IMCYTCHM 1ST ANTB: CPT | Performed by: NEUROLOGICAL SURGERY

## 2022-09-06 PROCEDURE — 88381 MICRODISSECTION MANUAL: CPT

## 2022-09-06 DEVICE — DURAGEN® DURAL GRAFT MATRIX 1PK, 2X2 DOM
Type: IMPLANTABLE DEVICE | Site: CRANIAL | Status: FUNCTIONAL
Brand: DURAGEN®

## 2022-09-06 DEVICE — FLOSEAL HEMOSTATIC MATRIX, 10ML
Type: IMPLANTABLE DEVICE | Site: CRANIAL | Status: FUNCTIONAL
Brand: FLOSEAL HEMOSTATIC MATRIX

## 2022-09-06 DEVICE — WAX BONE HEMO NAT 2.5G: Type: IMPLANTABLE DEVICE | Site: CRANIAL | Status: FUNCTIONAL

## 2022-09-06 DEVICE — SCRW CRS/DRV DRL FREE MICRO TI 1.5X4MM: Type: IMPLANTABLE DEVICE | Site: CRANIAL | Status: FUNCTIONAL

## 2022-09-06 DEVICE — PLT CVR LEVELONE BURHL LP CONTRL .3X17X1.5MM: Type: IMPLANTABLE DEVICE | Site: CRANIAL | Status: FUNCTIONAL

## 2022-09-06 RX ORDER — ONDANSETRON 4 MG/1
4 TABLET, FILM COATED ORAL EVERY 6 HOURS PRN
Status: DISCONTINUED | OUTPATIENT
Start: 2022-09-06 | End: 2022-09-07 | Stop reason: HOSPADM

## 2022-09-06 RX ORDER — NALOXONE HCL 0.4 MG/ML
0.4 VIAL (ML) INJECTION
Status: DISCONTINUED | OUTPATIENT
Start: 2022-09-06 | End: 2022-09-07 | Stop reason: HOSPADM

## 2022-09-06 RX ORDER — NICOTINE POLACRILEX 4 MG
15 LOZENGE BUCCAL
Status: DISCONTINUED | OUTPATIENT
Start: 2022-09-06 | End: 2022-09-07 | Stop reason: HOSPADM

## 2022-09-06 RX ORDER — ACETAMINOPHEN 500 MG
1000 TABLET ORAL EVERY 6 HOURS PRN
COMMUNITY

## 2022-09-06 RX ORDER — ACETAMINOPHEN 650 MG/1
650 SUPPOSITORY RECTAL EVERY 4 HOURS PRN
Status: DISCONTINUED | OUTPATIENT
Start: 2022-09-06 | End: 2022-09-07 | Stop reason: HOSPADM

## 2022-09-06 RX ORDER — SODIUM CHLORIDE 0.9 % (FLUSH) 0.9 %
10 SYRINGE (ML) INJECTION AS NEEDED
Status: DISCONTINUED | OUTPATIENT
Start: 2022-09-06 | End: 2022-09-07 | Stop reason: HOSPADM

## 2022-09-06 RX ORDER — FENTANYL CITRATE 50 UG/ML
50 INJECTION, SOLUTION INTRAMUSCULAR; INTRAVENOUS
Status: DISCONTINUED | OUTPATIENT
Start: 2022-09-06 | End: 2022-09-06 | Stop reason: HOSPADM

## 2022-09-06 RX ORDER — DEXAMETHASONE SODIUM PHOSPHATE 10 MG/ML
INJECTION INTRAMUSCULAR; INTRAVENOUS AS NEEDED
Status: DISCONTINUED | OUTPATIENT
Start: 2022-09-06 | End: 2022-09-06 | Stop reason: SURG

## 2022-09-06 RX ORDER — PROMETHAZINE HYDROCHLORIDE 25 MG/1
25 SUPPOSITORY RECTAL ONCE AS NEEDED
Status: DISCONTINUED | OUTPATIENT
Start: 2022-09-06 | End: 2022-09-06 | Stop reason: HOSPADM

## 2022-09-06 RX ORDER — BUPIVACAINE HYDROCHLORIDE AND EPINEPHRINE 5; 5 MG/ML; UG/ML
INJECTION, SOLUTION EPIDURAL; INTRACAUDAL; PERINEURAL AS NEEDED
Status: DISCONTINUED | OUTPATIENT
Start: 2022-09-06 | End: 2022-09-06 | Stop reason: HOSPADM

## 2022-09-06 RX ORDER — ONDANSETRON 2 MG/ML
4 INJECTION INTRAMUSCULAR; INTRAVENOUS ONCE AS NEEDED
Status: DISCONTINUED | OUTPATIENT
Start: 2022-09-06 | End: 2022-09-06 | Stop reason: HOSPADM

## 2022-09-06 RX ORDER — HYDROMORPHONE HYDROCHLORIDE 1 MG/ML
0.5 INJECTION, SOLUTION INTRAMUSCULAR; INTRAVENOUS; SUBCUTANEOUS
Status: DISCONTINUED | OUTPATIENT
Start: 2022-09-06 | End: 2022-09-07 | Stop reason: HOSPADM

## 2022-09-06 RX ORDER — ROCURONIUM BROMIDE 10 MG/ML
INJECTION, SOLUTION INTRAVENOUS AS NEEDED
Status: DISCONTINUED | OUTPATIENT
Start: 2022-09-06 | End: 2022-09-06 | Stop reason: SURG

## 2022-09-06 RX ORDER — CEFAZOLIN SODIUM 2 G/100ML
2 INJECTION, SOLUTION INTRAVENOUS EVERY 8 HOURS
Status: COMPLETED | OUTPATIENT
Start: 2022-09-06 | End: 2022-09-07

## 2022-09-06 RX ORDER — NALOXONE HCL 0.4 MG/ML
0.2 VIAL (ML) INJECTION AS NEEDED
Status: DISCONTINUED | OUTPATIENT
Start: 2022-09-06 | End: 2022-09-06 | Stop reason: HOSPADM

## 2022-09-06 RX ORDER — PROPOFOL 10 MG/ML
VIAL (ML) INTRAVENOUS AS NEEDED
Status: DISCONTINUED | OUTPATIENT
Start: 2022-09-06 | End: 2022-09-06 | Stop reason: SURG

## 2022-09-06 RX ORDER — MIDAZOLAM HYDROCHLORIDE 1 MG/ML
INJECTION INTRAMUSCULAR; INTRAVENOUS
Status: COMPLETED | OUTPATIENT
Start: 2022-09-06 | End: 2022-09-06

## 2022-09-06 RX ORDER — MIDAZOLAM HYDROCHLORIDE 1 MG/ML
1 INJECTION INTRAMUSCULAR; INTRAVENOUS
Status: DISCONTINUED | OUTPATIENT
Start: 2022-09-06 | End: 2022-09-06 | Stop reason: HOSPADM

## 2022-09-06 RX ORDER — HYDROMORPHONE HYDROCHLORIDE 1 MG/ML
0.5 INJECTION, SOLUTION INTRAMUSCULAR; INTRAVENOUS; SUBCUTANEOUS
Status: DISCONTINUED | OUTPATIENT
Start: 2022-09-06 | End: 2022-09-06 | Stop reason: HOSPADM

## 2022-09-06 RX ORDER — LIDOCAINE HYDROCHLORIDE 10 MG/ML
0.5 INJECTION, SOLUTION EPIDURAL; INFILTRATION; INTRACAUDAL; PERINEURAL ONCE AS NEEDED
Status: DISCONTINUED | OUTPATIENT
Start: 2022-09-06 | End: 2022-09-06 | Stop reason: HOSPADM

## 2022-09-06 RX ORDER — FAMOTIDINE 20 MG/1
20 TABLET, FILM COATED ORAL 2 TIMES DAILY
Status: DISCONTINUED | OUTPATIENT
Start: 2022-09-06 | End: 2022-09-07 | Stop reason: HOSPADM

## 2022-09-06 RX ORDER — IBUPROFEN 600 MG/1
600 TABLET ORAL ONCE AS NEEDED
Status: DISCONTINUED | OUTPATIENT
Start: 2022-09-06 | End: 2022-09-06 | Stop reason: HOSPADM

## 2022-09-06 RX ORDER — CEFAZOLIN SODIUM 2 G/100ML
2 INJECTION, SOLUTION INTRAVENOUS ONCE
Status: COMPLETED | OUTPATIENT
Start: 2022-09-06 | End: 2022-09-06

## 2022-09-06 RX ORDER — LEVETIRACETAM 500 MG/1
500 TABLET ORAL 2 TIMES DAILY
Status: DISCONTINUED | OUTPATIENT
Start: 2022-09-06 | End: 2022-09-07 | Stop reason: HOSPADM

## 2022-09-06 RX ORDER — HYDROCODONE BITARTRATE AND ACETAMINOPHEN 7.5; 325 MG/1; MG/1
1 TABLET ORAL ONCE AS NEEDED
Status: DISCONTINUED | OUTPATIENT
Start: 2022-09-06 | End: 2022-09-06 | Stop reason: HOSPADM

## 2022-09-06 RX ORDER — PRAVASTATIN SODIUM 20 MG
20 TABLET ORAL DAILY
Status: DISCONTINUED | OUTPATIENT
Start: 2022-09-07 | End: 2022-09-07 | Stop reason: HOSPADM

## 2022-09-06 RX ORDER — DEXTROSE MONOHYDRATE 25 G/50ML
25 INJECTION, SOLUTION INTRAVENOUS
Status: DISCONTINUED | OUTPATIENT
Start: 2022-09-06 | End: 2022-09-07 | Stop reason: HOSPADM

## 2022-09-06 RX ORDER — SODIUM CHLORIDE 0.9 % (FLUSH) 0.9 %
10 SYRINGE (ML) INJECTION EVERY 12 HOURS SCHEDULED
Status: DISCONTINUED | OUTPATIENT
Start: 2022-09-06 | End: 2022-09-07 | Stop reason: HOSPADM

## 2022-09-06 RX ORDER — AMOXICILLIN 250 MG
1 CAPSULE ORAL NIGHTLY PRN
Status: DISCONTINUED | OUTPATIENT
Start: 2022-09-06 | End: 2022-09-07 | Stop reason: HOSPADM

## 2022-09-06 RX ORDER — ALPRAZOLAM 0.25 MG/1
0.25 TABLET ORAL NIGHTLY PRN
Status: DISCONTINUED | OUTPATIENT
Start: 2022-09-06 | End: 2022-09-07 | Stop reason: HOSPADM

## 2022-09-06 RX ORDER — DEXAMETHASONE 4 MG/1
4 TABLET ORAL EVERY 8 HOURS
Status: DISCONTINUED | OUTPATIENT
Start: 2022-09-06 | End: 2022-09-07 | Stop reason: HOSPADM

## 2022-09-06 RX ORDER — SODIUM CHLORIDE 0.9 % (FLUSH) 0.9 %
3-10 SYRINGE (ML) INJECTION AS NEEDED
Status: DISCONTINUED | OUTPATIENT
Start: 2022-09-06 | End: 2022-09-06 | Stop reason: HOSPADM

## 2022-09-06 RX ORDER — SODIUM CHLORIDE, SODIUM LACTATE, POTASSIUM CHLORIDE, CALCIUM CHLORIDE 600; 310; 30; 20 MG/100ML; MG/100ML; MG/100ML; MG/100ML
INJECTION, SOLUTION INTRAVENOUS CONTINUOUS PRN
Status: DISCONTINUED | OUTPATIENT
Start: 2022-09-06 | End: 2022-09-06 | Stop reason: SURG

## 2022-09-06 RX ORDER — EPHEDRINE SULFATE 50 MG/ML
5 INJECTION, SOLUTION INTRAVENOUS ONCE AS NEEDED
Status: DISCONTINUED | OUTPATIENT
Start: 2022-09-06 | End: 2022-09-06 | Stop reason: HOSPADM

## 2022-09-06 RX ORDER — EPHEDRINE SULFATE 50 MG/ML
INJECTION, SOLUTION INTRAVENOUS AS NEEDED
Status: DISCONTINUED | OUTPATIENT
Start: 2022-09-06 | End: 2022-09-06 | Stop reason: SURG

## 2022-09-06 RX ORDER — DIPHENHYDRAMINE HYDROCHLORIDE 50 MG/ML
12.5 INJECTION INTRAMUSCULAR; INTRAVENOUS
Status: DISCONTINUED | OUTPATIENT
Start: 2022-09-06 | End: 2022-09-06 | Stop reason: HOSPADM

## 2022-09-06 RX ORDER — LIDOCAINE HYDROCHLORIDE 20 MG/ML
INJECTION, SOLUTION INFILTRATION; PERINEURAL AS NEEDED
Status: DISCONTINUED | OUTPATIENT
Start: 2022-09-06 | End: 2022-09-06 | Stop reason: SURG

## 2022-09-06 RX ORDER — PROMETHAZINE HYDROCHLORIDE 25 MG/1
25 TABLET ORAL ONCE AS NEEDED
Status: DISCONTINUED | OUTPATIENT
Start: 2022-09-06 | End: 2022-09-06 | Stop reason: HOSPADM

## 2022-09-06 RX ORDER — METFORMIN HYDROCHLORIDE 500 MG/1
1000 TABLET, EXTENDED RELEASE ORAL 2 TIMES DAILY
Status: DISCONTINUED | OUTPATIENT
Start: 2022-09-06 | End: 2022-09-07 | Stop reason: HOSPADM

## 2022-09-06 RX ORDER — BUTALBITAL, ACETAMINOPHEN AND CAFFEINE 50; 325; 40 MG/1; MG/1; MG/1
1 TABLET ORAL EVERY 4 HOURS PRN
Status: DISCONTINUED | OUTPATIENT
Start: 2022-09-06 | End: 2022-09-07 | Stop reason: HOSPADM

## 2022-09-06 RX ORDER — MAGNESIUM HYDROXIDE 1200 MG/15ML
LIQUID ORAL AS NEEDED
Status: DISCONTINUED | OUTPATIENT
Start: 2022-09-06 | End: 2022-09-06 | Stop reason: HOSPADM

## 2022-09-06 RX ORDER — SODIUM CHLORIDE, SODIUM LACTATE, POTASSIUM CHLORIDE, CALCIUM CHLORIDE 600; 310; 30; 20 MG/100ML; MG/100ML; MG/100ML; MG/100ML
9 INJECTION, SOLUTION INTRAVENOUS CONTINUOUS
Status: DISCONTINUED | OUTPATIENT
Start: 2022-09-06 | End: 2022-09-07 | Stop reason: HOSPADM

## 2022-09-06 RX ORDER — ACETAMINOPHEN 325 MG/1
650 TABLET ORAL EVERY 4 HOURS PRN
Status: DISCONTINUED | OUTPATIENT
Start: 2022-09-06 | End: 2022-09-07 | Stop reason: HOSPADM

## 2022-09-06 RX ORDER — OXYCODONE AND ACETAMINOPHEN 7.5; 325 MG/1; MG/1
1 TABLET ORAL EVERY 4 HOURS PRN
Status: DISCONTINUED | OUTPATIENT
Start: 2022-09-06 | End: 2022-09-06 | Stop reason: HOSPADM

## 2022-09-06 RX ORDER — FLUMAZENIL 0.1 MG/ML
0.2 INJECTION INTRAVENOUS AS NEEDED
Status: DISCONTINUED | OUTPATIENT
Start: 2022-09-06 | End: 2022-09-06 | Stop reason: HOSPADM

## 2022-09-06 RX ORDER — ONDANSETRON 2 MG/ML
4 INJECTION INTRAMUSCULAR; INTRAVENOUS EVERY 6 HOURS PRN
Status: DISCONTINUED | OUTPATIENT
Start: 2022-09-06 | End: 2022-09-07 | Stop reason: HOSPADM

## 2022-09-06 RX ORDER — LABETALOL HYDROCHLORIDE 5 MG/ML
5 INJECTION, SOLUTION INTRAVENOUS
Status: DISCONTINUED | OUTPATIENT
Start: 2022-09-06 | End: 2022-09-06 | Stop reason: HOSPADM

## 2022-09-06 RX ORDER — HYDROCODONE BITARTRATE AND ACETAMINOPHEN 5; 325 MG/1; MG/1
1 TABLET ORAL EVERY 4 HOURS PRN
Status: DISCONTINUED | OUTPATIENT
Start: 2022-09-06 | End: 2022-09-07 | Stop reason: HOSPADM

## 2022-09-06 RX ORDER — SODIUM CHLORIDE 0.9 % (FLUSH) 0.9 %
3 SYRINGE (ML) INJECTION EVERY 12 HOURS SCHEDULED
Status: DISCONTINUED | OUTPATIENT
Start: 2022-09-06 | End: 2022-09-06 | Stop reason: HOSPADM

## 2022-09-06 RX ORDER — DIPHENHYDRAMINE HCL 25 MG
25 CAPSULE ORAL
Status: DISCONTINUED | OUTPATIENT
Start: 2022-09-06 | End: 2022-09-06 | Stop reason: HOSPADM

## 2022-09-06 RX ORDER — FENTANYL CITRATE 50 UG/ML
INJECTION, SOLUTION INTRAMUSCULAR; INTRAVENOUS
Status: COMPLETED | OUTPATIENT
Start: 2022-09-06 | End: 2022-09-06

## 2022-09-06 RX ORDER — FENTANYL CITRATE 50 UG/ML
INJECTION, SOLUTION INTRAMUSCULAR; INTRAVENOUS AS NEEDED
Status: DISCONTINUED | OUTPATIENT
Start: 2022-09-06 | End: 2022-09-06 | Stop reason: SURG

## 2022-09-06 RX ORDER — FAMOTIDINE 10 MG/ML
20 INJECTION, SOLUTION INTRAVENOUS ONCE
Status: COMPLETED | OUTPATIENT
Start: 2022-09-06 | End: 2022-09-06

## 2022-09-06 RX ORDER — HYDRALAZINE HYDROCHLORIDE 20 MG/ML
5 INJECTION INTRAMUSCULAR; INTRAVENOUS
Status: DISCONTINUED | OUTPATIENT
Start: 2022-09-06 | End: 2022-09-06 | Stop reason: HOSPADM

## 2022-09-06 RX ADMIN — FENTANYL CITRATE 50 MCG: 50 INJECTION INTRAMUSCULAR; INTRAVENOUS at 14:08

## 2022-09-06 RX ADMIN — LIDOCAINE HYDROCHLORIDE 60 MG: 20 INJECTION, SOLUTION INFILTRATION; PERINEURAL at 16:40

## 2022-09-06 RX ADMIN — FENTANYL CITRATE 100 MCG: 50 INJECTION INTRAMUSCULAR; INTRAVENOUS at 16:40

## 2022-09-06 RX ADMIN — DEXAMETHASONE 4 MG: 4 TABLET ORAL at 22:35

## 2022-09-06 RX ADMIN — DEXAMETHASONE SODIUM PHOSPHATE 10 MG: 10 INJECTION INTRAMUSCULAR; INTRAVENOUS at 16:55

## 2022-09-06 RX ADMIN — SODIUM CHLORIDE, POTASSIUM CHLORIDE, SODIUM LACTATE AND CALCIUM CHLORIDE: 600; 310; 30; 20 INJECTION, SOLUTION INTRAVENOUS at 16:55

## 2022-09-06 RX ADMIN — EPHEDRINE SULFATE 10 MG: 50 INJECTION INTRAVENOUS at 17:04

## 2022-09-06 RX ADMIN — SODIUM CHLORIDE, POTASSIUM CHLORIDE, SODIUM LACTATE AND CALCIUM CHLORIDE 9 ML/HR: 600; 310; 30; 20 INJECTION, SOLUTION INTRAVENOUS at 16:24

## 2022-09-06 RX ADMIN — LABETALOL HYDROCHLORIDE 5 MG: 5 INJECTION, SOLUTION INTRAVENOUS at 18:58

## 2022-09-06 RX ADMIN — EPHEDRINE SULFATE 10 MG: 50 INJECTION INTRAVENOUS at 17:19

## 2022-09-06 RX ADMIN — LABETALOL HYDROCHLORIDE 5 MG: 5 INJECTION, SOLUTION INTRAVENOUS at 19:32

## 2022-09-06 RX ADMIN — METFORMIN HYDROCHLORIDE 1000 MG: 500 TABLET, EXTENDED RELEASE ORAL at 23:56

## 2022-09-06 RX ADMIN — CEFAZOLIN SODIUM 2 G: 2 INJECTION, SOLUTION INTRAVENOUS at 22:35

## 2022-09-06 RX ADMIN — LABETALOL HYDROCHLORIDE 5 MG: 5 INJECTION, SOLUTION INTRAVENOUS at 19:07

## 2022-09-06 RX ADMIN — CEFAZOLIN SODIUM 2 G: 2 INJECTION, SOLUTION INTRAVENOUS at 16:24

## 2022-09-06 RX ADMIN — Medication 10 ML: at 23:56

## 2022-09-06 RX ADMIN — FAMOTIDINE 20 MG: 20 TABLET ORAL at 22:35

## 2022-09-06 RX ADMIN — LEVETIRACETAM 500 MG: 500 TABLET, FILM COATED ORAL at 23:55

## 2022-09-06 RX ADMIN — FENTANYL CITRATE 50 MCG: 50 INJECTION INTRAMUSCULAR; INTRAVENOUS at 20:06

## 2022-09-06 RX ADMIN — FENTANYL CITRATE 50 MCG: 50 INJECTION INTRAMUSCULAR; INTRAVENOUS at 19:32

## 2022-09-06 RX ADMIN — MIDAZOLAM 1 MG: 1 INJECTION INTRAMUSCULAR; INTRAVENOUS at 14:08

## 2022-09-06 RX ADMIN — PROPOFOL 160 MG: 10 INJECTION, EMULSION INTRAVENOUS at 16:40

## 2022-09-06 RX ADMIN — ROCURONIUM BROMIDE 50 MG: 50 INJECTION INTRAVENOUS at 16:40

## 2022-09-06 RX ADMIN — SODIUM CHLORIDE, POTASSIUM CHLORIDE, SODIUM LACTATE AND CALCIUM CHLORIDE 9 ML/HR: 600; 310; 30; 20 INJECTION, SOLUTION INTRAVENOUS at 13:48

## 2022-09-06 RX ADMIN — FAMOTIDINE 20 MG: 10 INJECTION, SOLUTION INTRAVENOUS at 13:57

## 2022-09-06 RX ADMIN — LABETALOL HYDROCHLORIDE 5 MG: 5 INJECTION, SOLUTION INTRAVENOUS at 18:55

## 2022-09-06 RX ADMIN — EPHEDRINE SULFATE 5 MG: 50 INJECTION INTRAVENOUS at 18:13

## 2022-09-06 RX ADMIN — LABETALOL HYDROCHLORIDE 5 MG: 5 INJECTION, SOLUTION INTRAVENOUS at 21:19

## 2022-09-06 NOTE — INTERVAL H&P NOTE
H&P reviewed. The patient was examined and there are no changes to the H&P.      We discussed the risks of surgery including possibility of sudden cerebral edema from the tumor, hemorrhage into the tumor, stroke, bleeding, death as a result of the surgery.  She understands the risks and alternatives and is willing to proceed with surgery.    Flavio Billingsley MD  09/06/22  14:22 EDT

## 2022-09-06 NOTE — ANESTHESIA PROCEDURE NOTES
Airway  Urgency: elective    Date/Time: 9/6/2022 4:40 PM    General Information and Staff    Patient location during procedure: OR  Anesthesiologist: Abel Altamirano MD    Indications and Patient Condition  Indications for airway management: airway protection    Preoxygenated: yes  MILS maintained throughout  Mask difficulty assessment: 1 - vent by mask    Final Airway Details  Final airway type: endotracheal airway      Successful airway: ETT  Cuffed: yes   Successful intubation technique: direct laryngoscopy  Endotracheal tube insertion site: oral  Blade: Debra  ETT size (mm): 4.0  Cormack-Lehane Classification: grade IIa - partial view of glottis  Placement verified by: chest auscultation   Number of attempts at approach: 1  Assessment: lips, teeth, and gum same as pre-op and atraumatic intubation

## 2022-09-06 NOTE — OP NOTE
Stereotactic brain biopsy procedure Note    Ai Carreon  9/6/2022  [id number]    SURGEON  Flavio Billingsley MD    ASSISTANT:  Deonna Ardon CSA was present throughout the entire surgery to provide intraoperative suction, retraction, suturing, and irrigation to promote visualization by the operative surgeon and assist with opening and closure.  The skilled assistance was necessary for the success of this case.  Our practice does not have a relationship with neurosurgical residents.      INDICATION:  Ai Carreon is a 61 y.o. female who presents with headaches and confusion over several weeks, imaging revealed a large multifocal contrast-enhancing mass with bilateral extension and extensive edema with mass-effect.  She was treated with steroids for 1 week prior to biopsy to decrease risk of edema related side effects.    Pre-op Diagnosis:   Brain tumor (HCC) [D49.6]    Post-op Diagnosis:  Post-Op Diagnosis Codes:     * Brain tumor (HCC) [D49.6]    PROCEDURE PERFORMED:  Procedure(s):  RIGHT STEREOTACTIC BRAIN BIOPSY WITH STEALTH    Anesthesia: General    Staff:   Circulator: Asia Reyes RN  Scrub Person: Cayetano Quinteros  Assistant: Deonna Ardon CSA    Estimated Blood Loss: minimal    Specimens:    Order Name Source Comment Collection Info Order Time   TISSUE PATHOLOGY EXAM Brain  Collected By: Flavio Billingsley MD 9/6/2022  5:32 PM     Release to patient   Routine Release              Drains:   [REMOVED] External Urinary Catheter (Removed)   Site Assessment Clean;Skin intact 08/30/22 2000   Application/Removal external catheter applied 08/30/22 2000   Collection Container Wall suction 08/30/22 2000   Wall suction (mmHG) 120 mmHG 08/30/22 2000   Securement Method Securing device;Other (Comment) 08/30/22 2000   Catheter care complete Yes 08/30/22 2000   Output (mL) 400 mL 08/31/22 0908       Findings: Lesional tissue on frozen    Complications: none immediate    Narrative Description:    Positioning:  After operative  consent the patient was brought into the operating room and placed under general endotracheal anesthesia using intravenous and inhalational agents.  The patient was then positioned on the operating table in the supine position.  All pressure points were padded including peripheral points of entrapment.  The head was secured in the [place] headrest. Stealth navigation was then registered and then used to localize the incision.     Approach:  Following this the hair was shaved over the right frontal region and then prepped in a sterile technique.    A 2 cm incision was then made along the planned line.    Bovie cautery was then used to dissect the paracranium off the calvarium.  A small self-retaining retractor was used to hold open the scalp and a [employer] Terry drill was used to create a bur hole in the right frontal region.  A 1 Penfield instrument was used through remove any residual bone and a Kerrison rongeur was used to undercut the yves hole and extend the craniotomy slightly anteriorly based on imaging.  The dura was then opened sharply with 11 blade and cauterized using bipolar cautery.  A small corticectomy was then made using bipolar cautery avoiding a cortical vein in this area.      Biopsy:    The Kyndedus system was then secured over the yves hole and using Stealth navigation a blunt needle was then directed to the target site and 2 cores were sent off for frozen section.  An additional 10 cores and a combined total of 3 other levels were obtained and sent off for permanent section.  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     Closure: A small amount of bleeding was controlled using irrigation and bipolar cautery.  A 1 x 1 piece of Gelfoam was then placed over the dural defect and the yves hole was then covered using a KLS system medium sized bur hole cover.  The galea was closed with a 3-0 Vicryl  closure, subcutaneous and scalp layers were closed with 3-0 Monocryl. The patient will remain in the ICU overnight and an CT head without contrast scan will be obtained tomorrow AM for routine follow up or a CT scan sooner if clinically indicated.    Flavio Billingsley MD     Date: 9/6/2022  Time: 18:53 EDT

## 2022-09-06 NOTE — ANESTHESIA PREPROCEDURE EVALUATION
Anesthesia Evaluation     Patient summary reviewed and Nursing notes reviewed   NPO Solid Status: > 8 hours  NPO Liquid Status: > 2 hours           Airway   Mallampati: I  TM distance: >3 FB  Neck ROM: full  No difficulty expected  Dental - normal exam     Pulmonary - negative pulmonary ROS and normal exam   Cardiovascular - normal exam  Exercise tolerance: good (4-7 METS)    (+) hyperlipidemia,       Neuro/Psych- negative ROS  GI/Hepatic/Renal/Endo    (+)   diabetes mellitus type 2 well controlled,   (-) no renal disease    Musculoskeletal (-) negative ROS    Abdominal  - normal exam    Bowel sounds: normal.   Substance History - negative use     OB/GYN negative ob/gyn ROS         Other                        Anesthesia Plan    ASA 3     general and Arlene     intravenous induction     Anesthetic plan, risks, benefits, and alternatives have been provided, discussed and informed consent has been obtained with: patient.  Pre-procedure education provided  Use of blood products discussed with patient  Consented to blood products.   Plan discussed with CRNA and attending.        CODE STATUS:

## 2022-09-06 NOTE — ANESTHESIA POSTPROCEDURE EVALUATION
"Patient: Ai Carreon    Procedure Summary     Date: 09/06/22 Room / Location: [facility] OR [address] [place] [address] MAIN OR    Anesthesia Start: 1631 Anesthesia Stop: 1913    Procedure: RIGHT STEREOTACTIC BRAIN BIOPSY WITH STEALTH (Right Head) Diagnosis:       Brain tumor (HCC)      (Brain tumor (HCC) [D49.6])    Surgeons: Flavio Billingsley MD Provider: Arnie Westfall MD    Anesthesia Type: general, Arlene ASA Status: 3          Anesthesia Type: general, Leroy    Vitals  Vitals Value Taken Time   /81 09/06/22 1936   Temp 36.9 °C (98.4 °F) 09/06/22 1907   Pulse 64 09/06/22 1946   Resp 16 09/06/22 1935   SpO2 95 % 09/06/22 1946   Vitals shown include unvalidated device data.        Post Anesthesia Care and Evaluation    Patient location during evaluation: bedside  Patient participation: complete - patient participated  Level of consciousness: awake and alert  Pain management: adequate    Airway patency: patent  Anesthetic complications: No anesthetic complications    Cardiovascular status: acceptable  Respiratory status: acceptable  Hydration status: acceptable    Comments: /81   Pulse 71   Temp 36.9 °C (98.4 °F) (Oral)   Resp 16   Ht 170.2 cm (67\")   Wt 81.2 kg (179 lb 0.2 oz)   SpO2 93%   BMI 28.04 kg/m²     Patient is stable postoperatively and has adequately recovered from anesthesia as described above unless otherwise noted      "

## 2022-09-06 NOTE — TELEPHONE ENCOUNTER
Provider: TERRY  Caller: MYRNA MOTT  Relationship to Patient:   Pharmacy: N/A  Phone Number: [phone]  Reason for Call: PATIENT  TELEPHONED WITH QUESTIONS RE: PROCEDURE PATIENT IS SCHEDULED TO HAVE TODAY (FRONTAL LOBE BIOPSY).    WOULD LIKE TO S/W PROVIDER PRIOR TO THIS BIOPSY (SCHEDULED FOR 1:00 P.M. TODAY): WILL THE SURGEON FREEZE THE PORTION OF THE BIOPSY FOR FUTURE TESTING RE: GENETICS? WILL SPECIMEN BE SAVED?    PLEASE CALL.    THANK YOU.

## 2022-09-06 NOTE — ANESTHESIA PROCEDURE NOTES
Arterial Line      Patient reassessed immediately prior to procedure    Patient location during procedure: holding area   Line placed for hemodynamic monitoring and ABGs/Labs/ISTAT.  Performed By   Anesthesiologist: Hossein Taylor MD  Preanesthetic Checklist  Completed: patient identified, IV checked, site marked, risks and benefits discussed, surgical consent, monitors and equipment checked, pre-op evaluation and timeout performed  Arterial Line Prep   Sterile Tech: cap, gloves, sterile barriers and mask  Prep: ChloraPrep  Patient monitoring: EKG, continuous pulse oximetry and blood pressure monitoring  Arterial Line Procedure   Laterality:left  Location:  radial artery  Catheter size: 20 G   Guidance: palpation technique  Number of attempts: 1  Successful placement: yes  Post Assessment   Dressing Type: wrist guard applied, secured with tape and occlusive dressing applied.   Complications no  Circ/Move/Sens Assessment: normal and unchanged.   Patient Tolerance: patient tolerated the procedure well with no apparent complications

## 2022-09-07 ENCOUNTER — APPOINTMENT (OUTPATIENT)
Dept: CT IMAGING | Facility: HOSPITAL | Age: 62
End: 2022-09-07

## 2022-09-07 ENCOUNTER — READMISSION MANAGEMENT (OUTPATIENT)
Dept: CALL CENTER | Facility: HOSPITAL | Age: 62
End: 2022-09-07

## 2022-09-07 VITALS
RESPIRATION RATE: 17 BRPM | HEIGHT: 67 IN | WEIGHT: 183.2 LBS | HEART RATE: 60 BPM | DIASTOLIC BLOOD PRESSURE: 58 MMHG | TEMPERATURE: 98.1 F | OXYGEN SATURATION: 95 % | BODY MASS INDEX: 28.75 KG/M2 | SYSTOLIC BLOOD PRESSURE: 112 MMHG

## 2022-09-07 LAB
GLUCOSE BLDC GLUCOMTR-MCNC: 169 MG/DL (ref 70–130)
GLUCOSE BLDC GLUCOMTR-MCNC: 187 MG/DL (ref 70–130)

## 2022-09-07 PROCEDURE — 99222 1ST HOSP IP/OBS MODERATE 55: CPT | Performed by: INTERNAL MEDICINE

## 2022-09-07 PROCEDURE — 70450 CT HEAD/BRAIN W/O DYE: CPT

## 2022-09-07 PROCEDURE — 99024 POSTOP FOLLOW-UP VISIT: CPT | Performed by: NURSE PRACTITIONER

## 2022-09-07 PROCEDURE — 63710000001 DEXAMETHASONE PER 0.25 MG: Performed by: NEUROLOGICAL SURGERY

## 2022-09-07 PROCEDURE — 82962 GLUCOSE BLOOD TEST: CPT

## 2022-09-07 PROCEDURE — 25010000002 CEFAZOLIN IN DEXTROSE 2-4 GM/100ML-% SOLUTION: Performed by: NEUROLOGICAL SURGERY

## 2022-09-07 RX ORDER — HYDROCODONE BITARTRATE AND ACETAMINOPHEN 5; 325 MG/1; MG/1
1 TABLET ORAL EVERY 4 HOURS PRN
Qty: 30 TABLET | Refills: 0 | Status: SHIPPED | OUTPATIENT
Start: 2022-09-07 | End: 2022-09-13

## 2022-09-07 RX ORDER — ONDANSETRON 4 MG/1
4 TABLET, FILM COATED ORAL EVERY 6 HOURS PRN
Qty: 20 TABLET | Refills: 0 | Status: SHIPPED | OUTPATIENT
Start: 2022-09-07 | End: 2022-09-21

## 2022-09-07 RX ADMIN — METFORMIN HYDROCHLORIDE 1000 MG: 500 TABLET, EXTENDED RELEASE ORAL at 08:16

## 2022-09-07 RX ADMIN — Medication 10 ML: at 08:17

## 2022-09-07 RX ADMIN — HYDROCODONE BITARTRATE AND ACETAMINOPHEN 1 TABLET: 5; 325 TABLET ORAL at 02:32

## 2022-09-07 RX ADMIN — CEFAZOLIN SODIUM 2 G: 2 INJECTION, SOLUTION INTRAVENOUS at 06:00

## 2022-09-07 RX ADMIN — FAMOTIDINE 20 MG: 20 TABLET ORAL at 08:17

## 2022-09-07 RX ADMIN — HYDROCODONE BITARTRATE AND ACETAMINOPHEN 1 TABLET: 5; 325 TABLET ORAL at 08:17

## 2022-09-07 RX ADMIN — DEXAMETHASONE 4 MG: 4 TABLET ORAL at 06:00

## 2022-09-07 RX ADMIN — PRAVASTATIN SODIUM 20 MG: 20 TABLET ORAL at 08:17

## 2022-09-07 RX ADMIN — LEVETIRACETAM 500 MG: 500 TABLET, FILM COATED ORAL at 08:17

## 2022-09-07 NOTE — PROGRESS NOTES
[facility]     Mar/chart reviewed  Follow up icu management patient s/p brain biopsy  No visual changes no new weakness    Vital Sign Min/Max for last 24 hours  Temp  Min: 97.4 °F (36.3 °C)  Max: 98.4 °F (36.9 °C)   BP  Min: 100/72  Max: 152/75   Pulse  Min: 45  Max: 79   Resp  Min: 16  Max: 17   SpO2  Min: 93 %  Max: 99 %   Flow (L/min)  Min: 2  Max: 3   Weight  Min: 81.2 kg (179 lb 0.2 oz)  Max: 83.1 kg (183 lb 3.2 oz)     Nad, axox3,   perrl, eomi, no icterus,  mmm, no jvd, trachea midline, neck supple,  chest cta bilaterally, no crackles, no wheezes,   rrr,   soft, nt, nd +bs,  no c/c/ e  Skin warm, dry no rashes    Labs: 9/7: reviewed  Nothing new    Assessment:  Brain tumor (HCC)  Status post stereotactic brain biopsy  Diabetes mellitus  Leukocytosis        Recommendations:  ICU core measures  Neurochecks per protocol  Blood glucose monitoring per ICU protocol  Leukocytosis likely from steroids  Will follow in the ICU only  Home today?

## 2022-09-07 NOTE — PLAN OF CARE
Problem: Adult Inpatient Plan of Care  Goal: Plan of Care Review  Outcome: Ongoing, Not Progressing  Goal: Patient-Specific Goal (Individualized)  Outcome: Ongoing, Not Progressing  Goal: Absence of Hospital-Acquired Illness or Injury  Outcome: Ongoing, Not Progressing  Goal: Optimal Comfort and Wellbeing  Outcome: Ongoing, Not Progressing  Goal: Readiness for Transition of Care  Outcome: Ongoing, Not Progressing  Goal: Plan of Care Review  Outcome: Ongoing, Not Progressing  Goal: Patient-Specific Goal (Individualized)  Outcome: Ongoing, Not Progressing  Goal: Absence of Hospital-Acquired Illness or Injury  Outcome: Ongoing, Not Progressing  Goal: Optimal Comfort and Wellbeing  Outcome: Ongoing, Not Progressing  Goal: Readiness for Transition of Care  Outcome: Ongoing, Not Progressing   Goal Outcome Evaluation:

## 2022-09-07 NOTE — CONSULTS
CONSULT NOTE    Patient Identification:  Ai Carreon  61 y.o.  female  1960  [id number]            Requesting physician: Dr. Billingsley    Reason for Consultation: ICU comanagement    CC:     History of Present Illness:  61-year-old female with headache confusion over several weeks imaging showed contrast-enhancing mass with bilateral extension with extensive edema and mass-effect.  Patient underwent stereotactic brain biopsy by Dr. Billingsley.  Postop in the ICU for neurochecks.  Currently awake alert denies any complaints.  No headache no nausea no vomiting or double vision.  She has known history of type 2 diabetes on metformin.      Review of Systems  As above rest is negative  Past Medical History:  Past Medical History:   Diagnosis Date   • Brain tumor (HCC) 08/29/2022   • Colon polyps     FOLLOWED BY DR. AMARI CASTRO   • Diabetes mellitus (HCC)     TYPE 2   • Hyperglycemia    • Hyperlipidemia     MIXED   • Kidney stone     SEEN IN PAST BY DR. HARDEEP VILLASENOR   • Pericarditis 1977   • Postmenopausal    • Right ovarian cyst 2002    S/P EXCISION       Past Surgical History:  Past Surgical History:   Procedure Laterality Date   • COLONOSCOPY N/A 10/10/2019    5 MM TUBULAR ADENOMA POLYP IN SIGMOID, GRANULAR MUCOSA IN ILEOCECAL VALVE, BX: BENIGN, RESCOP[facility]YRS[facility]   • CYSTOSCOPY INSERTION / REMOVAL STENT / STONE N/A 10/31/2001    PLACEMENT OF LEFT DOUBLE J STENT, 6x24, DR. HAR[facility]EP [facility]   • LAPAROSCOPIC SALPINGOOPHERECTOMY Left 01/24/2002     AND EXCISION OF RIGHT OVARIAN CYST, DR. CHANDLER HAIR [facility]   • PARATHYROIDECTOMY  05/2020   • PELVIC LAPAROSCOPY      LSO   • TUBAL ABDOMINAL LIGATION Bilateral 1990    POSTPARTUM   • URETEROSCOPY STENT INSERTION N/A 07/13/2009    WITH STONE EXTRACTION, DR. HAR[facility]        Home Meds:  Medications Prior to Admission   Medication Sig Dispense Refill Last Dose   • acetaminophen (TYLENOL) 500 MG tablet Take 1,000 mg by mouth Every 6 (Six) Hours As  Needed for Mild Pain.   9/5/2022 at 2100   • dexamethasone (DECADRON) 4 MG tablet Take 1 tablet by mouth Every 8 (Eight) Hours. 30 tablet 0 9/5/2022 at 2300   • famotidine (Pepcid) 20 MG tablet Take 1 tablet by mouth 2 (Two) Times a Day. 30 tablet 0 9/6/2022 at 0800   • levETIRAcetam (Keppra) 500 MG tablet Take 1 tablet by mouth 2 (Two) Times a Day. 60 tablet 0 9/6/2022 at 0800   • metFORMIN ER (GLUCOPHAGE-XR) 500 MG 24 hr tablet Take 2 tablets by mouth 2 (Two) Times a Day. 360 tablet 1 9/5/2022 at 1900   • pravastatin (Pravachol) 20 MG tablet Take 1 tablet by mouth Daily. 90 tablet 0 9/5/2022 at 0800   • ALPRAZolam (Xanax) 0.25 MG tablet Take 1 tablet by mouth At Night As Needed for Anxiety. 6 tablet 0 NEVER TAKEN   • butalbital-acetaminophen-caffeine (Esgic) -40 MG per tablet Take 1 tablet by mouth Every 4 (Four) Hours As Needed for Headache. 20 tablet 0 Unknown at Unknown time       Allergies:  No Known Allergies    Social History:   Social History     Socioeconomic History   • Marital status:    Tobacco Use   • Smoking status: Never Smoker   • Smokeless tobacco: Never Used   Vaping Use   • Vaping Use: Never used   Substance and Sexual Activity   • Alcohol use: No   • Drug use: No   • Sexual activity: Yes     Partners: Male     Birth control/protection: Post-menopausal, Surgical     Comment: Tubal ligation       Family History:  Family History   Problem Relation Age of Onset   • Melanoma Mother    • Cancer Mother    • Coronary artery disease Father    • Prostate cancer Father    • Diabetes Father    • Heart disease Father    • No Known Problems Sister    • Cancer Brother    • Melanoma Brother    • Prostate cancer Brother    • No Known Problems Daughter    • No Known Problems Son    • No Known Problems Maternal Aunt    • No Known Problems Paternal Aunt    • Ovarian cancer Maternal Grandmother    • Cancer Maternal Grandmother    • Cancer Paternal Grandmother    • BRCA 1/2 Neg Hx    • Breast cancer Neg  "Hx    • Colon cancer Neg Hx    • Endometrial cancer Neg Hx    • Uterine cancer Neg Hx    • Malig Hyperthermia Neg Hx        Physical Exam:  /74   Pulse 63   Temp 97.4 °F (36.3 °C) (Oral)   Resp 17   Ht 170.2 cm (67\")   Wt 83.1 kg (183 lb 3.2 oz)   SpO2 94%   BMI 28.69 kg/m²  Body mass index is 28.69 kg/m². 94% 83.1 kg (183 lb 3.2 oz)  Physical Exam  Patient is examined using the personal protective equipment as per guidelines from infection control for this particular patient as enacted.  Hand hygiene was performed before and after patient interaction.  Well-developed normal body habitus  Eyes normal conjunctivae and pupils reactive to light  ENT Mallampati between 3 and 4 normal nasal exam  Neck midline trachea no thyromegaly  Chest no labored breathing clear  CVS regular rate and rhythm no lower extremity edema  Abdomen soft nontender no hepatosplenomegaly  CNS intact normal sensory exam  Skin no rashes no nodules  Psych oriented to time place and person normal memory  Musculoskeletal no cyanosis no clubbing normal range of motion        LABS:  Lab Results   Component Value Date    CALCIUM 9.2 08/31/2022    PHOS 3.3 08/31/2022     Results from last 7 days   Lab Units 08/31/22  0600   SODIUM mmol/L 142   POTASSIUM mmol/L 4.3   CHLORIDE mmol/L 108*   CO2 mmol/L 23.6   BUN mg/dL 14   CREATININE mg/dL 0.66   GLUCOSE mg/dL 143*   CALCIUM mg/dL 9.2   WBC 10*3/mm3 12.20*   HEMOGLOBIN g/dL 12.9   PLATELETS 10*3/mm3 234     Lab Results   Component Value Date    TROPONINT <0.010 08/29/2022                                 Lab Results   Component Value Date    TSH 1.050 09/11/2019     Estimated Creatinine Clearance: 99.2 mL/min (by C-G formula based on SCr of 0.66 mg/dL).         Imaging: I personally visualized the images of scans/x-rays performed within last 3 days.      Assessment:  Brain tumor (HCC)  Status post stereotactic brain biopsy  Diabetes mellitus  Leukocytosis      Recommendations:  ICU core " measures  Neurochecks per protocol  Blood glucose monitoring per ICU protocol  Leukocytosis likely from steroids  Will follow in the ICU only            Goyo Ortiz MD  9/6/2022  22:33 EDT      Much of this encounter note is an electronic transcription/translation of spoken language to printed text using Dragon Software.

## 2022-09-07 NOTE — CONSULTS
[facility] INITIAL INPATIENT CONSULTATION NOTE    REASON FOR CONSULTATION: Brain mass    HISTORY OF PRESENT ILLNESS:  Ai Carreon is a 61 y.o. female who we are asked to see today in consultation for a brain mass.  The patient 61-year-old female who initially had been admitted 8/29/2022 discharge 8/30/2022 having presented with headaches and confusion for 3 weeks.  She has history of diabetes mellitus type 2, hyperlipidemia and CT scan of the brain demonstrated a if right frontal lobe mass.  She was treated with IV dexamethasone and Keppra for seizure prophylaxis the MRI findings also consistent with potential GBM in the right frontal lobe and surrounding vasogenic edema.  As result of scheduling a biopsy could not be performed and she was discharged until the right frontal stereotactic brain biopsy with Stealth was performed 9/6/2022.  Those results are currently pending.    The patient is seen with multiple family members and we discussed a follow-up appointment in the next 10 to 14 days as the results become available.  She is already been seen by radiation therapy during her last admission-8/31/2022-we have discussed that tissue review is quite important as well to help determine treatment plan.     She and her family state that she is to be discharged after her consultation today and we will plan her follow-up appointment as noted.    Past Medical   Past Medical History:   Diagnosis Date   • Brain tumor (HCC) 08/29/2022   • Colon polyps     FOLLOWED BY DR. AMARI CASTRO   • Diabetes mellitus (HCC)     TYPE 2   • Hyperglycemia    • Hyperlipidemia     MIXED   • Kidney stone     SEEN IN PAST BY DR. HARDEEP VILLASENOR   • Pericarditis 1977   • Postmenopausal    • Right ovarian cyst 2002    S/P EXCISION     Social History   Social History     Socioeconomic History   • Marital status:    Tobacco Use   • Smoking status: Never Smoker   • Smokeless tobacco: Never Used   Vaping Use   • Vaping Use: Never  used   Substance and Sexual Activity   • Alcohol use: No   • Drug use: No   • Sexual activity: Yes     Partners: Male     Birth control/protection: Post-menopausal, Surgical     Comment: Tubal ligation     Family History  Family History   Problem Relation Age of Onset   • Melanoma Mother    • Cancer Mother    • Coronary artery disease Father    • Prostate cancer Father    • Diabetes Father    • Heart disease Father    • No Known Problems Sister    • Cancer Brother    • Melanoma Brother    • Prostate cancer Brother    • No Known Problems Daughter    • No Known Problems Son    • No Known Problems Maternal Aunt    • No Known Problems Paternal Aunt    • Ovarian cancer Maternal Grandmother    • Cancer Maternal Grandmother    • Cancer Paternal Grandmother    • BRCA 1/2 Neg Hx    • Breast cancer Neg Hx    • Colon cancer Neg Hx    • Endometrial cancer Neg Hx    • Uterine cancer Neg Hx    • Malig Hyperthermia Neg Hx      Medications    Current Facility-Administered Medications:   •  acetaminophen (TYLENOL) tablet 650 mg, 650 mg, Oral, Q4H PRN **OR** acetaminophen (TYLENOL) suppository 650 mg, 650 mg, Rectal, Q4H PRN, Flavio Billingsley MD  •  ALPRAZolam (XANAX) tablet 0.25 mg, 0.25 mg, Oral, Nightly PRN, Flavio Billingsley MD  •  butalbital-acetaminophen-caffeine (FIORICET, ESGIC) -40 MG per tablet 1 tablet, 1 tablet, Oral, Q4H PRN, Flavio Billingsley MD  •  dexamethasone (DECADRON) tablet 4 mg, 4 mg, Oral, Q8H, Flavio Billingsley MD, 4 mg at 09/07/22 0600  •  dextrose (D50W) (25 g/50 mL) IV injection 25 g, 25 g, Intravenous, Q15 Min PRN, Goyo Ortiz MD  •  dextrose (GLUTOSE) oral gel 15 g, 15 g, Oral, Q15 Min PRN, Goyo Ortiz MD  •  famotidine (PEPCID) tablet 20 mg, 20 mg, Oral, BID, Flavio Billingsley MD, 20 mg at 09/07/22 0817  •  glucagon (human recombinant) (GLUCAGEN DIAGNOSTIC) injection 1 mg, 1 mg, Intramuscular, Q15 Min PRN, Goyo Ortiz MD  •  HYDROcodone-acetaminophen (NORCO) 5-325 MG per tablet 1 tablet, 1 tablet, Oral,  Q4H PRN, Flavio Billingsley MD, 1 tablet at 09/07/22 0817  •  HYDROmorphone (DILAUDID) injection 0.5 mg, 0.5 mg, Intravenous, Q2H PRN **AND** naloxone (NARCAN) injection 0.4 mg, 0.4 mg, Intravenous, Q5 Min PRN, Flavio Billingsley MD  •  lactated ringers infusion, 9 mL/hr, Intravenous, Continuous, Hossein Taylor MD, Last Rate: 9 mL/hr at 09/06/22 1624, 9 mL/hr at 09/06/22 1624  •  levETIRAcetam (KEPPRA) tablet 500 mg, 500 mg, Oral, BID, Flavio Billingsley MD, 500 mg at 09/07/22 0817  •  metFORMIN ER (GLUCOPHAGE-XR) 24 hr tablet 1,000 mg, 1,000 mg, Oral, BID, Flavio Billingsley MD, 1,000 mg at 09/07/22 0816  •  ondansetron (ZOFRAN) injection 4 mg, 4 mg, Intravenous, Q6H PRN, Flavio Billingsley MD  •  ondansetron (ZOFRAN) tablet 4 mg, 4 mg, Oral, Q6H PRN, Flavio Billingsley MD  •  pravastatin (PRAVACHOL) tablet 20 mg, 20 mg, Oral, Daily, Flavio Billingsley MD, 20 mg at 09/07/22 0817  •  sennosides-docusate (PERICOLACE) 8.6-50 MG per tablet 1 tablet, 1 tablet, Oral, Nightly PRN, Flavio Billingsley MD  •  sodium chloride 0.9 % flush 10 mL, 10 mL, Intravenous, Q12H, Goyo Ortiz MD, 10 mL at 09/07/22 0817  •  sodium chloride 0.9 % flush 10 mL, 10 mL, Intravenous, PRN, Goyo Ortiz MD  Allergies  No Known Allergies  Review of Systems  Fourteen systems have been reviewed with the patient and are positive per HPI only.     Objective:     Vitals:    09/07/22 1124   BP:    Pulse:    Resp:    Temp: 98.1 °F (36.7 °C)   SpO2:      GENERAL:  Well-developed, well-nourished in no acute distress.   SKIN:  Warm, dry without rashes, purpura or petechiae.  HEAD:  Normocephalic.  EYES:  Pupils equal, round and reactive to light.  EOMs intact.  Conjunctivae normal.  EARS:  Hearing intact.  NOSE:  Septum midline.  No excoriations or nasal discharge.  MOUTH:  Tongue is well-papillated; no stomatitis or ulcers.  Lips normal.  THROAT:  Oropharynx without lesions or exudates.  NECK:  Supple with good range of motion; no thyromegaly or masses, no JVD.  LYMPHATICS:  No  cervical, supraclavicular, axillary or inguinal adenopathy.  CHEST:  Lungs clear to percussion and auscultation. Good airflow.  CARDIAC:  Regular rate and rhythm without murmurs, rubs or gallops. Normal S1,S2.  ABDOMEN:  Soft, nontender with no organomegaly or masses.  EXTREMITIES:  No clubbing, cyanosis or edema.  NEUROLOGICAL:  Cranial Nerves II-XII grossly intact.  No focal neurological deficits.  PSYCHIATRIC:  Normal affect and mood.        DIAGNOSTIC DATA:  *Repeat CBC pending      IMAGING:    MRI Brain With & Without Contrast (08/30/2022 09:42)    IMPRESSION:     1. The findings are most consistent with a glioblastoma multiforme with  multifocal enhancing high-grade tumor involving the superior medial  right frontal lobe with the largest focus of enhancing high-grade tumor  demonstrating central necrosis and it tracks across the anterior body of  the corpus callosum into the left frontal periventricular white matter,  I think it is much less likely primary CNS lymphoma. The largest  enhancing centrally necrotic mass tracks from the superior medial right  frontal lobe across the anterior body of the corpus callosum into the  medial left frontal periventricular white matter and this enhancing  centrally necrotic lesion measures 4.7 x 2.9 x 2.7 cm. There are  multiple additional adjacent areas of enhancing high-grade tumor in the  superior right frontal lobe parenchyma and then there is an additional  enhancing centrally necrotic mass extending posterior to the dominant  mass within the anterior body of the corpus callosum this adjacent  second corpus callosal mass is located within the central body of the  corpus callosum that extends eccentrically into the posterior superior  medial left frontal periventricular white matter and this enhancing  centrally necrotic mass measures up to 3.6 x 1.5 x 1.6 cm. There is  exuberant surrounding FLAIR and T2 hyperintensity tracking throughout  nearly the entire superior  right frontal white matter up to 7.5 x 5 x 5  cm that is felt to be surrounding vasogenic edema or could be  infiltrating areas of nonenhancing tumor or combination of above and  there is some FLAIR and T2 high signal along the margins of the  component that extends in the posterior superior medial left frontal  white matter that tracks up to 2.8 x 1.9 cm that is either surrounding  edema or infiltrating nonenhancing tumor. There is mass effect on the  frontal horns and anterior bodies of lateral ventricles and due to the  asymmetric involvement of the superior right frontal lobe there is  asymmetric mass effect and there is 4-5 mm right-to-left midline shift  at the level of the mid to superior falx cerebri. I communicated the  results Nazia Medeiros the nurse practitioner from Neurosurgery by  telephone on 08/30/2022 at 10:30 AM.     This report was finalized on 8/30/2022 12:52 PM by Dr. Casey Rodriguez M.D.    CT Head Without Contrast (09/07/2022 03:31)  FINDINGS:  Since prior examination, the patient has undergone right frontal  stereotactic brain biopsy. There is expected pneumocephalus. There is a  small amount of hemorrhage identified within the biopsy tract, without  large intraparenchymal hematoma seen. Prior CT demonstrated areas of  increased attenuation within the frontal lobes bilaterally, and  involving the corpus callosum, with surrounding areas of decreased  attenuation, ingesting edema. These are again seen on the current  examination, although I think the amount of edema is actually improved  when compared to the prior exam. There is persistent mass effect upon  the frontal horns of the lateral ventricles, particularly on the right.  There is midline shift of approximately 4 to 5 mm, which is stable.  There is effacement of the sulci within the right frontal lobe, stable  when compared to prior exam. Paranasal sinuses and mastoid air cells  appear clear. There is some mild right frontal scalp edema  and  subcutaneous air, in keeping with recent biopsy.     IMPRESSION:  Postoperative findings, as noted above.         Assessment/Plan:   This is a 61 y.o. female with:    *Right frontal lobe mass potentially consistent with glioblastoma multiforme , status post stereotactic biopsy 9/6/2022  · Pathology currently pending  · Radiation therapy consultation reviewed from 8/31/2022  · Schedule follow-up appointment at [facility] in 10 to 14 days.        *Diabetes mellitus  · Exacerbated by steroid medications, blood glucose monitoring per ICU ongoing      *Current leukocytosis  · Promoted by current dexamethasone use.    Earl Isaacs MD

## 2022-09-07 NOTE — DISCHARGE INSTRUCTIONS
[facility]  [address], [address]  [place] [address]  Phone:  [phone]  Fax:  [phone]    Dr. Flavio Billingsley MD            Brain Biopsy Post-Operative Instructions        Sleep and rest with your head at a 30° angle of elevation.  This helps keep brain swelling down and prevents increased pressure in the head.  This is most important immediately after surgery.  You can sleep flat after the first week.    Contact your surgeon immediately if you develop a persistent drainage from the incision, this could be a cerebrospinal fluid (CSF)  leak and needs to be managed quickly.    You may resume your usual diet as you tolerate    No lifting overhead, although you may place your arms above your head.  DO NOT lift anything over five (5) pounds.  Walking is allowed and encouraged. There are no restrictions on sitting or climbing stairs, but refrain from overly strenuous activity and take it slow and easy.  You may notice that a constant position (standing or sitting) greater than 45 minutes may tend to make you more sore or tired than normal and therefore it is recommended that you change positions frequently. Do not drive until you are seen back for your first postoperative visit, although you may be a passenger in a car.      You do not have to keep a dressing on the incision but you should cover the incision with a hat, bandana, or scarf when you leave your home. Make sure your head covering is loose and breathable.    Refrain from any sexual activity until after your first evaluation at the office.     You may shower and pat the incision dry, but do not soak your wound in water such as a swimming pool, hot tub or lake.   Avoid direct sunlight to the wound for at least three (3) months.  You may apply ice to the surgical site for 15 to 20 minutes each hour while awake for the first few days following surgery.  Simply put the ice in a plastic bag in place a towel between the bag of ice and your  skin.    You will leave the hospital with prescriptions for pain medicine.  These medications must be taken as prescribed only.  If a refill of your pain medication is required, in order to have this medication refilled, you must contact the office 3 days (72 hrs) prior to running out, but the amount prescribed is generally enough to last until the first post-operative visit.      A small amount of bleeding from the incision during the first few days is not unusual.       You should have a post-operative visit approximately 2 weeks after surgery.  If you do not have a scheduled appointment, call the office at [phone] to schedule one.  We will discuss return to work at your first post-operative visit, but you can expect to be off of work for an average of 6 weeks.     Notify the office if there are any problems, such as:    Excessive incision pain, headache, nausea or vomiting  Persistent temperature of 101.5° F (38.6 ° C) or greater that is not relieved by Tylenol.  Excessive bleeding, redness, heat, leakage of fluid, swelling or pus around the incision   If you have difficulty breathing, have chest pain or shortness of breath, call 911.   You develop swelling in the calf or leg  Your pain is not controlled with medication  You seem to be getting worse rather than better    Thank you.

## 2022-09-07 NOTE — PAYOR COMM NOTE
"Laura Mott (61 y.o. Female)                         ATTENTION;     INITIAL CLINICALS FOR REVIEW AUTH PENDING [id number]                      REPLY TO UR DEPT RADHA LEMON N  , UR  [phone]                            Date of Birth   1960    Social Security Number       Address   [address] [place] [address]    Home Phone   [phone]    MRN   [id number]       Catholic   Mosque    Marital Status                               Admission Date   9/6/22    Admission Type   Elective    Admitting Provider   Flavio Billingsley MD    Attending Provider   Flavio Billingsley MD    Department, Room/Bed   [facility] INTENSIVE CARE, I380/1       Discharge Date       Discharge Disposition       Discharge Destination                               Attending Provider: Flavio Billingsley MD    Allergies: No Known Allergies    Isolation: None   Infection: None   Code Status: CPR   Advance Care Planning Activity    Ht: 170.2 cm (67\")   Wt: 83.1 kg (183 lb 3.2 oz)    Admission Cmt: None   Principal Problem: Brain tumor (HCC) [D49.6]                 Active Insurance as of 9/6/2022     Primary Coverage     Payor Plan Insurance Group Employer/Plan Group    [employer]HE[employer] BLUE CROSS [employer] BLUE CROSS BLUE [employer]IELD PPO [id number]     Payor Plan Address Payor Plan Phone Number Payor Plan Fax Number Effective Dates    [address] [phone]  1/1/2022 - None Entered    [place] [address]       Subscriber Name Subscriber Birth Date Member ID       LAURA MOTT 1960 [id number]                 Emergency Contacts      (Rel.) Home Phone Work Phone Mobile Phone    Landry Mott (Spouse) [phone] -- --    MARLENYAVERY (Daughter) -- -- [phone]    Kulwant Mott -- -- [phone]               History & Physical      Flavio Billingsley MD at 09/06/22 1421          H&P reviewed. The patient was examined and there are no changes to the H&P.      We discussed the risks of surgery including " possibility of sudden cerebral edema from the tumor, hemorrhage into the tumor, stroke, bleeding, death as a result of the surgery.  She understands the risks and alternatives and is willing to proceed with surgery.    Flavio Billingsley MD  22  14:22 EDT      Electronically signed by Flavio Billingsley MD at 22 1422   Source Note          HISTORY AND PHYSICAL   [facility]        Date of Admission: 2022  Patient Identification:  Name: Ai Carreon  Age: 61 y.o.  Sex: female  :  1960  MRN: [id number]                     Primary Care Physician: Shanelle Mathis MD    Chief Complaint:  61 year old female who presented to the emergency room with headaches and confusion for the last three weeks; family first noted it when they were on a vacation; she has been slower to answer questions than usual and has been slower to complete activities; she denies fever or chills; she was seen in and ED while on vacation but was told that they could not find anything and that her trip was perhaps too strenuous for her;     History of Present Illness:   As above    Past Medical History:  Past Medical History:   Diagnosis Date   • Colon polyps     FOLLOWED BY DR. AMARI CASTRO   • Diabetes mellitus (HCC)     TYPE 2   • Hyperglycemia    • Hyperlipidemia     MIXED   • Kidney stone     SEEN IN PAST BY DR. HARDEEP VILLASENOR   • Pericarditis    • Postmenopausal    • Right ovarian cyst 2002    S/P EXCISION     Past Surgical History:  Past Surgical History:   Procedure Laterality Date   • COLONOSCOPY N/A 10/10/2019    5 MM TUBULAR ADENOMA POLYP IN SIGMOID, GRANULAR MUCOSA IN ILEOCECAL VALVE, BX: BENIGN, RESCOPE IN 5YRS, DR. AMARI CASTRO AT [facility]   • CYSTOSCOPY INSERTION / REMOVAL STENT / STONE N/A 10/31/2001    PLACEMENT OF LEFT DOUBLE J STENT, 6x24, DR. HARDEEP VILLASENOR AT [facility]   • LAPAROSCOPIC SALPINGOOPHERECTOMY Left 2002     AND EXCISION OF RIGHT OVARIAN CYST, DR. CHANDLER HAIR AT [facility]   • PARATHYROIDECTOMY   05/2020   • PELVIC LAPAROSCOPY      LSO   • TUBAL ABDOMINAL LIGATION Bilateral 1990    POSTPARTUM   • URETEROSCOPY STENT INSERTION N/A 07/13/2009    WITH STONE EXTRACTION, DR. HARDEEP VILLASENOR AT [facility] Meds:  Medications Prior to Admission   Medication Sig Dispense Refill Last Dose   • metFORMIN ER (GLUCOPHAGE-XR) 500 MG 24 hr tablet Take 2 tablets by mouth 2 (Two) Times a Day. 360 tablet 1 8/29/2022 at Unknown time   • pravastatin (Pravachol) 20 MG tablet Take 1 tablet by mouth Daily. 90 tablet 0 8/29/2022 at Unknown time       Allergies:  No Known Allergies  Immunizations:  Immunization History   Administered Date(s) Administered   • COVID-19 (MODERNA) 1st, 2nd, 3rd Dose Only 02/12/2021, 03/12/2021, 11/15/2021   • COVID-19 (MODERNA) BOOSTER 07/19/2022   • Flu Vaccine Intradermal Quad 18-64YR 11/06/2019   • FluLaval/Fluzone >6mos 09/22/2020   • Hepatitis A 06/03/2008, 11/26/2008   • Shingrix 09/22/2020, 02/17/2021   • Tdap 06/03/2008   • flucelvax quad pfs =>4 YRS 11/06/2019     Social History:   Social History     Social History Narrative   • Not on file     Social History     Socioeconomic History   • Marital status:    Tobacco Use   • Smoking status: Never Smoker   • Smokeless tobacco: Never Used   Vaping Use   • Vaping Use: Never used   Substance and Sexual Activity   • Alcohol use: No   • Drug use: No   • Sexual activity: Yes     Partners: Male     Birth control/protection: Post-menopausal, Surgical     Comment: Tubal ligation       Family History:  Family History   Problem Relation Age of Onset   • Melanoma Mother    • Cancer Mother    • Coronary artery disease Father    • Prostate cancer Father    • Diabetes Father    • Heart disease Father    • No Known Problems Sister    • Cancer Brother    • Melanoma Brother    • Prostate cancer Brother    • No Known Problems Daughter    • No Known Problems Son    • Ovarian cancer Maternal Grandmother    • Cancer Maternal Grandmother    • No Known Problems  "Maternal Aunt    • No Known Problems Paternal Aunt    • Cancer Paternal Grandmother    • BRCA 1/2 Neg Hx    • Breast cancer Neg Hx    • Colon cancer Neg Hx    • Endometrial cancer Neg Hx    • Uterine cancer Neg Hx         Review of Systems  See history of present illness and past medical history.  Patient denies syncope, falls, trauma, change in vision, change in hearing, change in taste, changes in weight, changes in appetite, focal weakness, numbness, or paresthesia.  Patient denies chest pain, palpitations, dyspnea, orthopnea, PND, cough, sinus pressure, rhinorrhea, epistaxis, hemoptysis, nausea, vomiting,hematemesis, diarrhea, constipation or hematchezia.  Denies cold or heat intolerance, polydipsia, polyuria, polyphagia. Denies hematuria, pyuria, dysuria, hesitancy, frequency or urgency. Denies consumption of raw and under cooked meats foods or change in water source.  Denies fever, chills, sweats, night sweats.  Denies missing any routine medications. Remainder of ROS is negative.    Objective:  T Max 24 hrs: Temp (24hrs), Av.5 °F (36.4 °C), Min:97 °F (36.1 °C), Max:98 °F (36.7 °C)    Vitals Ranges:   Temp:  [97 °F (36.1 °C)-98 °F (36.7 °C)] 98 °F (36.7 °C)  Heart Rate:  [73-96] 73  Resp:  [16] 16  BP: (127-138)/(69-98) 138/70      Exam:  /70 (BP Location: Right arm, Patient Position: Lying)   Pulse 73   Temp 98 °F (36.7 °C) (Oral)   Resp 16   Ht 170.2 cm (67\")   Wt 86.6 kg (191 lb)   SpO2 97%   BMI 29.91 kg/m²     General Appearance:    Alert, cooperative, no distress, appears stated age   Head:    Normocephalic, without obvious abnormality, atraumatic   Eyes:    PERRL, conjunctivae/corneas clear, EOM's intact, both eyes   Ears:    Normal external ear canals, both ears   Nose:   Nares normal, septum midline, mucosa normal, no drainage    or sinus tenderness   Throat:   Lips, mucosa, and tongue normal   Neck:   Supple, symmetrical, trachea midline, no adenopathy;     thyroid:  no " enlargement/tenderness/nodules; no carotid    bruit or JVD   Back:     Symmetric, no curvature, ROM normal, no CVA tenderness   Lungs:     Decreased breath sounds bilaterally, respirations unlabored   Chest Wall:    No tenderness or deformity    Heart:    Regular rate and rhythm, S1 and S2 normal, no murmur, rub   or gallop   Abdomen:     Soft, nontender, bowel sounds active all four quadrants,     no masses, no hepatomegaly, no splenomegaly   Extremities:   Extremities normal, atraumatic, no cyanosis or edema   Pulses:   2+ and symmetric all extremities   Skin:   Skin color, texture, turgor normal, no rashes or lesions   Lymph nodes:   Cervical, supraclavicular, and axillary nodes normal   Neurologic:   CNII-XII intact, normal strength, sensation intact throughout      .    Data Review:  Labs in chart were reviewed.  WBC   Date Value Ref Range Status   08/29/2022 6.90 3.40 - 10.80 10*3/mm3 Final     Hemoglobin   Date Value Ref Range Status   08/29/2022 13.8 12.0 - 15.9 g/dL Final     Hematocrit   Date Value Ref Range Status   08/29/2022 40.7 34.0 - 46.6 % Final     Platelets   Date Value Ref Range Status   08/29/2022 249 140 - 450 10*3/mm3 Final     Sodium   Date Value Ref Range Status   08/29/2022 144 136 - 145 mmol/L Final     Potassium   Date Value Ref Range Status   08/29/2022 4.5 3.5 - 5.2 mmol/L Final     Comment:     Slight hemolysis detected by analyzer. Results may be affected.     Chloride   Date Value Ref Range Status   08/29/2022 104 98 - 107 mmol/L Final     CO2   Date Value Ref Range Status   08/29/2022 25.1 22.0 - 29.0 mmol/L Final     BUN   Date Value Ref Range Status   08/29/2022 10 8 - 23 mg/dL Final     Creatinine   Date Value Ref Range Status   08/29/2022 0.71 0.57 - 1.00 mg/dL Final     Glucose   Date Value Ref Range Status   08/29/2022 117 (H) 65 - 99 mg/dL Final     Calcium   Date Value Ref Range Status   08/29/2022 9.3 8.6 - 10.5 mg/dL Final     Magnesium   Date Value Ref Range Status    08/29/2022 1.9 1.6 - 2.4 mg/dL Final                Imaging Results (All)     Procedure Component Value Units Date/Time    CT Abdomen Pelvis With Contrast [[id number]] Collected: 08/29/22 1829     Updated: 08/29/22 1829    Narrative:      CT OF THE CHEST, ABDOMEN AND PELVIS WITH CONTRAST 08/29/2022     HISTORY: Brain lesions. Evaluate for neoplasm.     TECHNIQUE: Spiral images were obtained from the lung apices to the  symphysis pubis after intravenous contrast. No oral contrast was given.     FINDINGS: Tiny amount of fluid is seen in pericardial recesses. No  pathologically enlarged hilar or mediastinal or axillary nodes are seen.     No lung masses or significant pulmonary infiltrates are seen.     The liver, gallbladder, spleen, pancreas, adrenals and kidneys appear  unremarkable except for mildly prominent left renal pelvis and/or  possible parapelvic cyst. There also is a tiny cortical cyst in the  lower pole left kidney.     There is some aortoiliac calcification.     No bowel wall thickening or bowel dilatation is seen. Uterus and urinary  bladder appear unremarkable.     No pathologically enlarged lymph nodes or other masses are seen in the  abdomen and pelvis.     No bony lesions are seen.       Impression:      1. No acute process identified in the chest, abdomen and pelvis.  2. There is no evidence of neoplasm.     Radiation dose reduction techniques were utilized, including automated  exposure control and exposure modulation based on body size.          CT Chest With Contrast Diagnostic [[id number]] Collected: 08/29/22 1829     Updated: 08/29/22 1829    Narrative:      CT OF THE CHEST, ABDOMEN AND PELVIS WITH CONTRAST 08/29/2022     HISTORY: Brain lesions. Evaluate for neoplasm.     TECHNIQUE: Spiral images were obtained from the lung apices to the  symphysis pubis after intravenous contrast. No oral contrast was given.     FINDINGS: Tiny amount of fluid is seen in pericardial recesses.  No  pathologically enlarged hilar or mediastinal or axillary nodes are seen.     No lung masses or significant pulmonary infiltrates are seen.     The liver, gallbladder, spleen, pancreas, adrenals and kidneys appear  unremarkable except for mildly prominent left renal pelvis and/or  possible parapelvic cyst. There also is a tiny cortical cyst in the  lower pole left kidney.     There is some aortoiliac calcification.     No bowel wall thickening or bowel dilatation is seen. Uterus and urinary  bladder appear unremarkable.     No pathologically enlarged lymph nodes or other masses are seen in the  abdomen and pelvis.     No bony lesions are seen.       Impression:      1. No acute process identified in the chest, abdomen and pelvis.  2. There is no evidence of neoplasm.     Radiation dose reduction techniques were utilized, including automated  exposure control and exposure modulation based on body size.          CT Head Without Contrast [[id number]] Collected: 08/29/22 1810     Updated: 08/29/22 1811    Narrative:      EMERGENCY NONCONTRAST HEAD CT 08/29/2022     CLINICAL HISTORY: Patient has intermittent confusion.      TECHNIQUE: Spiral CT images were obtained from the base of the skull to  the vertex without intravenous contrast. Images were reformatted and  submitted in 3 mm thick axial, sagittal and coronal CT sections with  brain algorithm.     COMPARISON: There are no prior studies from [facility] for comparison.     FINDINGS: The complex noncontrast head CT is very abnormal with an area  of low-density involving the anterior and medial right frontal lobe  white matter that tracks up to 4.2 x 3.3 x 4 cm and then there is an  additional area of low-density in the superior medial right frontal  white matter that tracks up to 7 x 3 x 4 cm in anterior posterior,  medial lateral and craniocaudal dimension. There is low-density that  tracks into the anterior body of the corpus callosum and goes  from right  to left across the midline and then tracks into the left frontal  periventricular white matter and additional areas of low-density in the  superior left frontal white matter that measures 3 x 2.5 cm and there is  some hyperdensity that tracks across the anterior body of the corpus  callosum and into the right frontal periventricular white matter and  also into the left frontal periventricular white matter and I suspect  that the higher density area represents more densely cellular tumor and  low-density areas represent surrounding vasogenic edema or infiltrate  and non hyperdense tumor and this probably is a glioma that crosses the  corpus callosum. Other possibility is its CNS lymphoma. There is mass  effect on the frontal horns and anterior bodies of the lateral  ventricles and other than the mass effect on the frontal horns and  anterior bodies of the lateral ventricles the remainder of the lateral  and third and fourth ventricle are normal in size. The posterior aspect  of the cerebral hemispheres and posterior cranial fossa structures are  normal in appearance. No extra-axial fluid collections are identified  and there is no evidence of acute intracranial hemorrhage. The calvarium  and skull base are normal in appearance, paranasal sinuses, mastoid air  cells and middle ear cavities are clear.       Impression:      1. This complex noncontrast head CT demonstrates prominent areas of  low-density in the anteromedial and superior medial right frontal white  matter that then tracks across the anterior body of the corpus callosum  into the left frontal periventricular white matter adjacent to which are  hyperdense areas in the right frontal periventricular white matter  tracking across the corpus callosum and the left frontal periventricular  white matter and the consolation findings suggest that the hyperdense  areas are densely cellular tumor areas of adjacent low-density are  likely vasogenic edema  or less cellular tumor and this most probably  represents a glioblastoma multiforme and less likely CNS lymphoma I  strongly recommend a contrast-enhanced MRI of the brain to further  characterize the pathology. The results and recommendation for  contrast-enhanced MRI of the brain were communicated to Dr. Ezra Allen in  the ER by telephone 08/29/2022 at 4 PM.      Radiation dose reduction techniques were utilized, including automated  exposure control and exposure modulation based on body size.          XR Chest 1 View [[id number]] Collected: 08/29/22 1436     Updated: 08/29/22 1439    Narrative:      XR CHEST 1 VW-     Clinical: Acute mental status change     FINDINGS: Cardiac size within normal limits, no mediastinal or hilar  abnormality in the lungs are clear.     CONCLUSION: No active disease of the chest     This report was finalized on 8/29/2022 2:36 PM by Dr. Bob Michele M.D.               Assessment:  Active Hospital Problems    Diagnosis  POA   • Frontal mass of brain [G93.89]  Unknown   • Vasogenic edema (HCC) [G93.6]  Unknown   • Brain tumor (HCC) [D49.6]  Yes      Resolved Hospital Problems   No resolved problems to display.   headache  Diabetes      Plan:  Continue steroids, keppra  Workup in progress  Ct chest/abd without acute findings  Appreciate help from neurosurgery  Notes and labs reviewed  D.w patient, family and ED Provider  Awaiting mri  Sabine Aguirre MD  8/29/2022  20:31 EDT      Electronically signed by Sabine Aguirre MD at 08/29/22 2036                    Vital Signs (last day)     Date/Time Temp Temp src Pulse Resp BP Patient Position SpO2    09/07/22 0800 -- -- 73 -- 100/72 -- 94    09/07/22 0738 98 (36.7) Oral -- -- -- -- --    09/07/22 0700 -- -- 57 -- 141/70 -- 94    09/07/22 0600 -- -- 59 -- 126/65 -- 94    09/07/22 0500 -- -- 56 -- 134/67 -- 93    09/07/22 0400 98 (36.7) Oral 55 -- 122/64 -- 93    09/07/22 0300 -- -- 55 -- 129/62 -- 93    09/07/22 0200 -- -- 64 --  126/67 -- 94    09/07/22 0100 -- -- 57 -- 124/62 -- 93    09/07/22 0000 -- -- 59 -- 120/73 -- 94    09/06/22 2300 -- -- 60 -- 108/59 -- 94    09/06/22 2216 97.4 (36.3) Oral 63 17 -- Lying 94    09/06/22 2150 -- -- 63 16 137/74 -- 98    09/06/22 2135 -- -- 60 16 136/74 -- 99    09/06/22 2120 -- -- 60 16 125/77 -- 98    09/06/22 2105 -- -- 59 16 141/70 -- 98    09/06/22 2050 -- -- 65 16 139/72 -- 98    09/06/22 2040 -- -- 62 16 134/75 -- 96    09/06/22 2020 -- -- 63 16 137/64 -- 98    09/06/22 2005 -- -- 65 16 137/75 -- 98    09/06/22 1950 -- -- 71 16 138/68 -- 97    09/06/22 1945 -- -- 69 16 129/65 -- 95    09/06/22 1935 -- -- 71 16 111/81 -- 93    09/06/22 1930 -- -- 67 -- 144/81 -- 93    09/06/22 1925 -- -- 69 16 145/72 -- 93    09/06/22 1920 -- -- 72 16 151/81 -- 96    09/06/22 1915 -- -- 70 16 149/74 -- 94    09/06/22 1910 -- -- 70 16 148/72 -- 94    09/06/22 1907 98.4 (36.9) Oral 79 16 152/75 Lying 96    09/06/22 14:12:20 -- -- 47 16 135/55 Lying 95    09/06/22 1412 -- -- 47 -- -- -- 96    09/06/22 1409 -- -- 45 -- -- -- 99    09/06/22 1406 -- -- 46 -- -- -- 97    09/06/22 1231 97.9 (36.6) Oral 45 16 135/55 Lying 98          Oxygen Therapy (last day)     Date/Time SpO2 Device (Oxygen Therapy) Flow (L/min) Oxygen Concentration (%) ETCO2 (mmHg)    09/07/22 0800 94 -- -- -- --    09/07/22 0700 94 -- -- -- --    09/07/22 0600 94 -- -- -- --    09/07/22 0500 93 -- -- -- --    09/07/22 0400 93 -- -- -- --    09/07/22 0300 93 -- -- -- --    09/07/22 0200 94 -- -- -- --    09/07/22 0100 93 -- -- -- --    09/07/22 0000 94 -- -- -- --    09/06/22 2300 94 -- -- -- --    09/06/22 2216 94 -- 3 -- --    09/06/22 2150 98 -- -- -- --    09/06/22 2135 99 -- -- -- --    09/06/22 2120 98 -- -- -- --    09/06/22 2105 98 -- -- -- --    09/06/22 2050 98 -- -- -- --    09/06/22 2040 96 -- -- -- --    09/06/22 2020 98 -- -- -- --    09/06/22 2005 98 -- -- -- --    09/06/22 1950 97 -- -- -- --    09/06/22 1945 95 -- -- -- --    09/06/22  1935 93 nasal cannula 3 -- --    09/06/22 1930 93 nasal cannula 3 -- --    09/06/22 1925 93 room air -- -- --    09/06/22 1920 96 -- -- -- --    09/06/22 1915 94 -- -- -- --    09/06/22 1910 94 -- -- -- --    09/06/22 1907 96 nasal cannula 3 -- --    09/06/22 14:12:20 95 nasal cannula 2 -- --    09/06/22 1412 96 -- -- -- --    09/06/22 1409 99 -- -- -- --    09/06/22 1406 97 -- -- -- --    09/06/22 1231 98 room air -- -- --          Lines, Drains & Airways     Active LDAs     Name Placement date Placement time Site Days    Peripheral IV 09/07/22 0255 Anterior;Right Forearm 09/07/22  0255  Forearm  less than 1    Arterial Line 09/06/22 Left Radial 09/06/22  1427  created via procedure documentation  Radial  less than 1          Inactive LDAs     Name Placement date Placement time Removal date Removal time Site Days    [REMOVED] Peripheral IV 08/30/22 2204 Left Hand 08/30/22 2204 09/06/22  NOT PRESENT UPON ADMISSION  --  Hand  6    [REMOVED] Peripheral IV 09/06/22 1235 Anterior;Left Forearm 09/06/22  1235  09/07/22  0255  Forearm  less than 1    [REMOVED] External Urinary Catheter 08/29/22 2037 09/06/22  NOT PRESENT UPON ADMISSION  --  --  7    [REMOVED] ETT  09/06/22  1640  created via procedure documentation  09/06/22  1858  -- less than 1                  Facility-Administered Medications as of 9/7/2022   Medication Dose Route Frequency Provider Last Rate Last Admin   • acetaminophen (TYLENOL) tablet 650 mg  650 mg Oral Q4H PRN Flavio Billingsley MD        Or   • acetaminophen (TYLENOL) suppository 650 mg  650 mg Rectal Q4H PRN Flavio Billingsley MD       • ALPRAZolam (XANAX) tablet 0.25 mg  0.25 mg Oral Nightly PRN Flavio Billingsley MD       • butalbital-acetaminophen-caffeine (FIORICET, ESGIC) -40 MG per tablet 1 tablet  1 tablet Oral Q4H PRN Flavio Billingsley MD       • [COMPLETED] ceFAZolin in dextrose (ANCEF) IVPB solution 2 g  2 g Intravenous Once Flavio Billingsley MD   2 g at 09/06/22 1624   • [COMPLETED] ceFAZolin in  dextrose (ANCEF) IVPB solution 2 g  2 g Intravenous Q8H Flavio Billingsley MD   2 g at 09/07/22 0600   • dexamethasone (DECADRON) tablet 4 mg  4 mg Oral Q8H Flavio Billingsley MD   4 mg at 09/07/22 0600   • dextrose (D50W) (25 g/50 mL) IV injection 25 g  25 g Intravenous Q15 Min PRN Goyo Ortiz MD       • dextrose (GLUTOSE) oral gel 15 g  15 g Oral Q15 Min PRN Goyo Ortiz MD       • [COMPLETED] famotidine (PEPCID) injection 20 mg  20 mg Intravenous Once Hossein Taylor MD   20 mg at 09/06/22 1357   • famotidine (PEPCID) tablet 20 mg  20 mg Oral BID Flavio Billingsley MD   20 mg at 09/07/22 0817   • [COMPLETED] fentaNYL citrate (PF) (SUBLIMAZE) injection   Intravenous Code / Trauma / Sedation Medication Hossein Taylor MD   50 mcg at 09/06/22 1408   • glucagon (human recombinant) (GLUCAGEN DIAGNOSTIC) injection 1 mg  1 mg Intramuscular Q15 Min PRN Goyo Ortiz MD       • HYDROcodone-acetaminophen (NORCO) 5-325 MG per tablet 1 tablet  1 tablet Oral Q4H PRN Flavio Billingsley MD   1 tablet at 09/07/22 0817   • HYDROmorphone (DILAUDID) injection 0.5 mg  0.5 mg Intravenous Q2H PRN Flavio Billingsley MD        And   • naloxone (NARCAN) injection 0.4 mg  0.4 mg Intravenous Q5 Min PRN Flavio Billingsley MD       • lactated ringers infusion  9 mL/hr Intravenous Continuous Hossein Taylor MD 9 mL/hr at 09/06/22 1624 9 mL/hr at 09/06/22 1624   • levETIRAcetam (KEPPRA) tablet 500 mg  500 mg Oral BID Flavio Billingsley MD   500 mg at 09/07/22 0817   • metFORMIN ER (GLUCOPHAGE-XR) 24 hr tablet 1,000 mg  1,000 mg Oral BID Flavio Billingsley MD   1,000 mg at 09/07/22 0816   • [COMPLETED] midazolam (VERSED) injection   Intravenous Code / Trauma / Sedation Medication Hossein Taylor MD   1 mg at 09/06/22 1408   • ondansetron (ZOFRAN) injection 4 mg  4 mg Intravenous Q6H PRN Flavio Billingsley MD       • ondansetron (ZOFRAN) tablet 4 mg  4 mg Oral Q6H PRN Flavio Billingsley MD       • pravastatin (PRAVACHOL) tablet 20 mg  20 mg Oral Daily Flavio Billingsley  MD KMIBERLY   20 mg at 09/07/22 0817   • sennosides-docusate (PERICOLACE) 8.6-50 MG per tablet 1 tablet  1 tablet Oral Nightly PRN Flavio Billingsley MD       • sodium chloride 0.9 % flush 10 mL  10 mL Intravenous Q12H Goyo Ortiz MD   10 mL at 09/07/22 0817   • sodium chloride 0.9 % flush 10 mL  10 mL Intravenous PRN Goyo Ortiz MD         Orders (last 24 hrs)      Start     Ordered    09/07/22 1017  Transfer Patient  Once         09/07/22 1016    09/07/22 1017  Discontinue Cardiac Monitoring  Once         09/07/22 1016    09/07/22 0958  Hematology & Oncology Inpatient Consult  Once        Specialty:  Hematology and Oncology  Provider:  Toney Soliman MD    09/07/22 0958 09/07/22 0900  pravastatin (PRAVACHOL) tablet 20 mg  Daily         09/06/22 2204 09/07/22 0700  POC Glucose TID AC  3 Times Daily Before Meals       09/06/22 2235 09/07/22 0606  POC Glucose Once  PROCEDURE ONCE         09/07/22 0603    09/07/22 0600  Incentive Spirometry  Every 2 Hours While Awake       09/06/22 2204 09/07/22 0500  CT Head Without Contrast  1 Time Imaging         09/06/22 2204 09/07/22 0000  insulin regular (humuLIN R,novoLIN R) injection 0-14 Units  Every 6 Hours Scheduled,   Status:  Discontinued         09/06/22 2235 09/06/22 2330  sodium chloride 0.9 % flush 10 mL  Every 12 Hours Scheduled         09/06/22 2235 09/06/22 2300  metFORMIN ER (GLUCOPHAGE-XR) 24 hr tablet 1,000 mg  2 Times Daily         09/06/22 2204 09/06/22 2300  dexamethasone (DECADRON) tablet 4 mg  Every 8 Hours         09/06/22 2204 09/06/22 2300  famotidine (PEPCID) tablet 20 mg  2 Times Daily         09/06/22 2204 09/06/22 2300  levETIRAcetam (KEPPRA) tablet 500 mg  2 Times Daily         09/06/22 2204 09/06/22 2300  ceFAZolin in dextrose (ANCEF) IVPB solution 2 g  Every 8 Hours         09/06/22 2204    09/06/22 2300  Vital Signs Every Hour and Per Hospital Policy Based on Patient Condition  Every Hour       09/06/22 7940     09/06/22 2300  Intake and Output  Every Hour       09/06/22 2235 09/06/22 2236  Do NOT Hold Basal or Correction Scale Insulin When Patient is NPO, Hold Scheduled Mealtime (Bolus) Insulin if NPO  Continuous         09/06/22 2235 09/06/22 2236  Cardiac Monitoring  Continuous,   Status:  Canceled         09/06/22 2235 09/06/22 2236  Continuous Pulse Oximetry  Continuous         09/06/22 2235 09/06/22 2236  Height & Weight  Once         09/06/22 2235 09/06/22 2236  Daily Weights  Daily       09/06/22 2235 09/06/22 2236  Oral care for patients not on NPPV or intubated  Every Shift      Comments: Oral care for patients not on NPPV or intubated    09/06/22 2235 09/06/22 2236  Use Mobility Guidelines for Advancement of Activity  Continuous         09/06/22 2235 09/06/22 2236  Insert Peripheral IV  Once         09/06/22 2235 09/06/22 2236  Saline Lock & Maintain IV Access  Continuous         09/06/22 2235 09/06/22 2236  Code Status and Medical Interventions:  Continuous         09/06/22 2235 09/06/22 2236  Place Sequential Compression Device  Once         09/06/22 2235 09/06/22 2236  Maintain Sequential Compression Device  Continuous         09/06/22 2235 09/06/22 2235  sodium chloride 0.9 % flush 10 mL  As Needed         09/06/22 2235 09/06/22 2235  dextrose (GLUTOSE) oral gel 15 g  Every 15 Minutes PRN         09/06/22 2235 09/06/22 2235  dextrose (D50W) (25 g/50 mL) IV injection 25 g  Every 15 Minutes PRN         09/06/22 2235 09/06/22 2235  glucagon (human recombinant) (GLUCAGEN DIAGNOSTIC) injection 1 mg  Every 15 Minutes PRN         09/06/22 2235 09/06/22 2205  Vital Signs and Neurochecks  Continuous         09/06/22 2204 09/06/22 2205  Advance Diet as Tolerated  Until Discontinued         09/06/22 2204 09/06/22 2205  Oxygen Therapy- Nasal Cannula; Titrate for SPO2: 90% - 95%  Continuous         09/06/22 2204 09/06/22 2205  Continuous Pulse Oximetry   "Continuous,   Status:  Canceled         09/06/22 2204 09/06/22 2205  Place Sequential Compression Device  Once         09/06/22 2204 09/06/22 2205  Maintain Sequential Compression Device  Continuous         09/06/22 2204 09/06/22 2205  Diet Regular  Diet Effective Now         09/06/22 2204 09/06/22 2205  Cardiac Monitoring  Continuous,   Status:  Canceled         09/06/22 2204 09/06/22 2205  May Be Off Telemetry for Tests  Continuous         09/06/22 2204 09/06/22 2204  butalbital-acetaminophen-caffeine (FIORICET, ESGIC) -40 MG per tablet 1 tablet  Every 4 Hours PRN         09/06/22 2204 09/06/22 2204  ALPRAZolam (XANAX) tablet 0.25 mg  Nightly PRN         09/06/22 2204 09/06/22 2204  acetaminophen (TYLENOL) tablet 650 mg  Every 4 Hours PRN        \"Or\" Linked Group Details    09/06/22 2204 09/06/22 2204  acetaminophen (TYLENOL) suppository 650 mg  Every 4 Hours PRN        \"Or\" Linked Group Details    09/06/22 2204 09/06/22 2204  HYDROcodone-acetaminophen (NORCO) 5-325 MG per tablet 1 tablet  Every 4 Hours PRN         09/06/22 2204 09/06/22 2204  HYDROmorphone (DILAUDID) injection 0.5 mg  Every 2 Hours PRN        \"And\" Linked Group Details    09/06/22 2204 09/06/22 2204  naloxone (NARCAN) injection 0.4 mg  Every 5 Minutes PRN        \"And\" Linked Group Details    09/06/22 2204 09/06/22 2204  sennosides-docusate (PERICOLACE) 8.6-50 MG per tablet 1 tablet  Nightly PRN         09/06/22 2204 09/06/22 2204  ondansetron (ZOFRAN) injection 4 mg  Every 6 Hours PRN         09/06/22 2204 09/06/22 2204  ondansetron (ZOFRAN) tablet 4 mg  Every 6 Hours PRN         09/06/22 2204 09/06/22 2202  POC Glucose Once  PROCEDURE ONCE         09/06/22 2200    09/06/22 1911  POC Glucose Once  PROCEDURE ONCE         09/06/22 1909    09/06/22 1854  Goal SBP < 140  Nursing Communication  Once        Comments: Goal SBP < 140    09/06/22 1853    09/06/22 1842  POC Glucose Once  Once,   Status: "  Canceled        Comments: Post op glucose check on all diabetic patients...Notify Anesthesia if blood sugar is less than 80 mg/dL or greater than 220 mg/dL.      09/06/22 1841 09/06/22 1842  Vital signs every 5 minutes for 15 minutes, every 15 minutes thereafter.  Once,   Status:  Canceled         09/06/22 1841 09/06/22 1842  Call Anesthesiologist for additional IV Fluid bolus for Hypotension/Tachycardia  Until Discontinued,   Status:  Canceled         09/06/22 1841 09/06/22 1842  Notify Anesthesia of Any Acute Changes in Patient Condition  Until Discontinued,   Status:  Canceled         09/06/22 1841 09/06/22 1842  Notify Anesthesia for Unrelieved Pain  Until Discontinued,   Status:  Canceled         09/06/22 1841 09/06/22 1842  Once DC criteria to floor met, follow surgeon's orders.  Until Discontinued,   Status:  Canceled         09/06/22 1841 09/06/22 1842  Discharge patient from PACU when discharge criteria is met.  Until Discontinued,   Status:  Canceled         09/06/22 1841 09/06/22 1841  ibuprofen (ADVIL,MOTRIN) tablet 600 mg  Once As Needed,   Status:  Discontinued         09/06/22 1841 09/06/22 1841  HYDROcodone-acetaminophen (NORCO) 7.5-325 MG per tablet 1 tablet  Once As Needed,   Status:  Discontinued         09/06/22 1841 09/06/22 1841  HYDROmorphone (DILAUDID) injection 0.5 mg  Every 5 Minutes PRN,   Status:  Discontinued         09/06/22 1841 09/06/22 1841  oxyCODONE-acetaminophen (PERCOCET) 7.5-325 MG per tablet 1 tablet  Every 4 Hours PRN,   Status:  Discontinued         09/06/22 1841 09/06/22 1841  fentaNYL citrate (PF) (SUBLIMAZE) injection 50 mcg  Every 5 Minutes PRN,   Status:  Discontinued         09/06/22 1841 09/06/22 1841  naloxone (NARCAN) injection 0.2 mg  As Needed,   Status:  Discontinued         09/06/22 1841 09/06/22 1841  flumazenil (ROMAZICON) injection 0.2 mg  As Needed,   Status:  Discontinued         09/06/22 1841 09/06/22 1841   "ondansetron (ZOFRAN) injection 4 mg  Once As Needed,   Status:  Discontinued         09/06/22 1841 09/06/22 1841  promethazine (PHENERGAN) suppository 25 mg  Once As Needed,   Status:  Discontinued        \"Or\" Linked Group Details    09/06/22 1841 09/06/22 1841  promethazine (PHENERGAN) tablet 25 mg  Once As Needed,   Status:  Discontinued        \"Or\" Linked Group Details    09/06/22 1841 09/06/22 1841  labetalol (NORMODYNE,TRANDATE) injection 5 mg  Every 5 Minutes PRN,   Status:  Discontinued         09/06/22 1841 09/06/22 1841  hydrALAZINE (APRESOLINE) injection 5 mg  Every 10 Minutes PRN,   Status:  Discontinued         09/06/22 1841 09/06/22 1841  ePHEDrine injection 5 mg  Once As Needed,   Status:  Discontinued         09/06/22 1841 09/06/22 1841  diphenhydrAMINE (BENADRYL) injection 12.5 mg  Every 15 Minutes PRN,   Status:  Discontinued         09/06/22 1841 09/06/22 1841  diphenhydrAMINE (BENADRYL) capsule 25 mg  Every 30 Minutes PRN,   Status:  Discontinued         09/06/22 1841 09/06/22 1841  Pulse Oximetry, Continuous  Continuous,   Status:  Canceled         09/06/22 1840 09/06/22 1840  Oxygen Therapy- Nasal Cannula; Titrate for SPO2: per policy  Continuous PRN,   Status:  Canceled       09/06/22 1840 09/06/22 1825  bupivacaine-EPINEPHrine PF (MARCAINE w/EPI) 0.5% -[id number] injection  As Needed,   Status:  Discontinued         09/06/22 1825 09/06/22 1824  sodium chloride (NS) irrigation solution  As Needed,   Status:  Discontinued         09/06/22 1824 09/06/22 1732  Tissue Pathology Exam  RELEASE UPON ORDERING         09/06/22 1732    09/06/22 1427  Inpatient Admission  Once         09/06/22 1429    09/06/22 1427  Head of Bed  Until Discontinued         09/06/22 1429    09/06/22 1408  fentaNYL citrate (PF) (SUBLIMAZE) injection  Code / Trauma / Sedation Medication         09/06/22 1408    09/06/22 1408  midazolam (VERSED) injection  Code / Trauma / Sedation Medication   "       09/06/22 1408    09/06/22 1346  sodium chloride 0.9 % flush 3 mL  Every 12 Hours Scheduled,   Status:  Discontinued         09/06/22 1344    09/06/22 1346  lactated ringers infusion  Continuous         09/06/22 1344    09/06/22 1346  famotidine (PEPCID) injection 20 mg  Once         09/06/22 1344    09/06/22 1345  Insert Peripheral IV  Once,   Status:  Canceled         09/06/22 1344    09/06/22 1345  Saline Lock & Maintain IV Access  Continuous,   Status:  Canceled         09/06/22 1344    09/06/22 1345  Pulse Oximetry, Continuous  Continuous,   Status:  Canceled         09/06/22 1344    09/06/22 1345  POC Glucose Once  Once,   Status:  Canceled        Comments: For all diabetic patients. Notify Anesthesiologist for blood sugar greater than 180 or less than 60..      09/06/22 1344    09/06/22 1345  May take Beta Blocker from home with sip of water.  Once,   Status:  Canceled        Comments: Only applicable to patients on home Beta Blocker    09/06/22 1344    09/06/22 1344  sodium chloride 0.9 % flush 3-10 mL  As Needed,   Status:  Discontinued         09/06/22 1344    09/06/22 1344  lidocaine PF 1% (XYLOCAINE) injection 0.5 mL  Once As Needed,   Status:  Discontinued         09/06/22 1344    09/06/22 1344  fentaNYL citrate (PF) (SUBLIMAZE) injection 50 mcg  Every 10 Minutes PRN,   Status:  Discontinued         09/06/22 1344    09/06/22 1344  midazolam (VERSED) injection 1 mg  Every 10 Minutes PRN,   Status:  Discontinued         09/06/22 1344    09/06/22 1344  Vital Signs - Per Anesthesia Protocol  As Needed,   Status:  Canceled       09/06/22 1344    09/06/22 1344  Oxygen Therapy- Nasal Cannula; Titrate for SPO2: Per Policy  Continuous PRN,   Status:  Canceled       09/06/22 1344    09/06/22 1344  Pulse Oximetry, Continuous  Continuous PRN,   Status:  Canceled       09/06/22 1344    09/06/22 1344  POC Glucose PRN  As Needed,   Status:  Canceled      Comments: For all diabetic patients. Notify  Anesthesiologist for blood sugar > 180.      09/06/22 1344    09/06/22 1245  ceFAZolin in dextrose (ANCEF) IVPB solution 2 g  Once         09/06/22 1243    09/06/22 1244  Obtain Informed Consent  Once,   Status:  Canceled         09/06/22 1243    09/06/22 1230  POC Glucose Once  PROCEDURE ONCE         09/06/22 1224    Unscheduled  Follow Hypoglycemia Standing Orders (Blood Glucose <70)  As Needed      Comments: Follow Protocol As Outlined in Process Instructions (Open Order Report to View Full Instructions)    09/06/22 2235    --  acetaminophen (TYLENOL) 500 MG tablet  Every 6 Hours PRN         09/06/22 1304                   Operative/Procedure Notes       Flavio Billingsley MD at 09/06/22 1712          Stereotactic brain biopsy procedure Note    Ai Carreon  9/6/2022  [id number]    SURGEON  Flavio Billingsley MD    ASSISTANT:  Deonna Ardon CSA was present throughout the entire surgery to provide intraoperative suction, retraction, suturing, and irrigation to promote visualization by the operative surgeon and assist with opening and closure.  The skilled assistance was necessary for the success of this case.  Our practice does not have a relationship with neurosurgical residents.      INDICATION:  Ai Carreon is a 61 y.o. female who presents with headaches and confusion over several weeks, imaging revealed a large multifocal contrast-enhancing mass with bilateral extension and extensive edema with mass-effect.  She was treated with steroids for 1 week prior to biopsy to decrease risk of edema related side effects.    Pre-op Diagnosis:   Brain tumor (HCC) [D49.6]    Post-op Diagnosis:  Post-Op Diagnosis Codes:     * Brain tumor (HCC) [D49.6]    PROCEDURE PERFORMED:  Procedure(s):  RIGHT STEREOTACTIC BRAIN BIOPSY WITH STEALTH    Anesthesia: General    Staff:   Circulator: Asia Reyes RN  Scrub Person: Cayetano Quinteros  Assistant: Deonna Ardon CSA    Estimated Blood Loss: minimal    Specimens:    Order Name Source  Comment Collection Info Order Time   TISSUE PATHOLOGY EXAM Brain  Collected By: Flavio Billingsley MD 9/6/2022  5:32 PM     Release to patient   Routine Release              Drains:   [REMOVED] External Urinary Catheter (Removed)   Site Assessment Clean;Skin intact 08/30/22 2000   Application/Removal external catheter applied 08/30/22 2000   Collection Container Wall suction 08/30/22 2000   Wall suction (mmHG) 120 mmHG 08/30/22 2000   Securement Method Securing device;Other (Comment) 08/30/22 2000   Catheter care complete Yes 08/30/22 2000   Output (mL) 400 mL 08/31/22 0908       Findings: Lesional tissue on frozen    Complications: none immediate    Narrative Description:    Positioning:  After operative consent the patient was brought into the operating room and placed under general endotracheal anesthesia using intravenous and inhalational agents.  The patient was then positioned on the operating table in the supine position.  All pressure points were padded including peripheral points of entrapment.  The head was secured in the [place] headrest. Stealth navigation was then registered and then used to localize the incision.     Approach:  Following this the hair was shaved over the right frontal region and then prepped in a sterile technique.    A 2 cm incision was then made along the planned line.    Bovie cautery was then used to dissect the paracranium off the calvarium.  A small self-retaining retractor was used to hold open the scalp and a Wisheryas Terry drill was used to create a bur hole in the right frontal region.  A 1 Penfield instrument was used through remove any residual bone and a Kerrison rongeur was used to undercut the yves hole and extend the craniotomy slightly anteriorly based on imaging.  The dura was then opened sharply with 11 blade and cauterized using bipolar cautery.  A small corticectomy was then made using bipolar cautery avoiding a cortical vein in this area.      Biopsy:    The  mBlox Navigus system was then secured over the yves hole and using Stealth navigation a blunt needle was then directed to the target site and 2 cores were sent off for frozen section.  An additional 10 cores and a combined total of 3 other levels were obtained and sent off for permanent section.  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     Closure: A small amount of bleeding was controlled using irrigation and bipolar cautery.  A 1 x 1 piece of Gelfoam was then placed over the dural defect and the yves hole was then covered using a KLS system medium sized bur hole cover.  The galea was closed with a 3-0 Vicryl closure, subcutaneous and scalp layers were closed with 3-0 Monocryl. The patient will remain in the ICU overnight and an CT head without contrast scan will be obtained tomorrow AM for routine follow up or a CT scan sooner if clinically indicated.    Flavio Billingsley MD     Date: 9/6/2022  Time: 18:53 EDT      Electronically signed by Flavio Billingsley MD at 09/06/22 1900          Consult Notes      Goyo Ortiz MD at 09/06/22 2232          CONSULT NOTE    Patient Identification:  Ai Carreon  61 y.o.  female  1960  [id number]            Requesting physician: Dr. Billingsley    Reason for Consultation: ICU comanagement    CC:     History of Present Illness:  61-year-old female with headache confusion over several weeks imaging showed contrast-enhancing mass with bilateral extension with extensive edema and mass-effect.  Patient underwent stereotactic brain biopsy by Dr. Billingsley.  Postop in the ICU for neurochecks.  Currently awake alert denies any complaints.  No headache no nausea no vomiting or double vision.  She has known history of type 2 diabetes on metformin.      Review of Systems  As above rest is negative  Past Medical History:  Past Medical History:   Diagnosis Date   • Brain tumor (HCC) 08/29/2022   • Colon polyps      FOLLOWED BY DR. AMARI CASTRO   • Diabetes mellitus (HCC)     TYPE 2   • Hyperglycemia    • Hyperlipidemia     MIXED   • Kidney stone     SEEN IN PAST BY DR. HARDEEP VILLASENOR   • Pericarditis 1977   • Postmenopausal    • Right ovarian cyst 2002    S/P EXCISION       Past Surgical History:  Past Surgical History:   Procedure Laterality Date   • COLONOSCOPY N/A 10/10/2019    5 MM TUBULAR ADENOMA POLYP IN SIGMOID, GRANULAR MUCOSA IN ILEOCECAL VALVE, BX: BENIGN, RESCOPE IN 5YRS, DR. AMARI CASTRO AT [facility]   • CYSTOSCOPY INSERTION / REMOVAL STENT / STONE N/A 10/31/2001    PLACEMENT OF LEFT DOUBLE J STENT, 6x24, DR. HARDEEP VILLASENOR AT [facility]   • LAPAROSCOPIC SALPINGOOPHERECTOMY Left 01/24/2002     AND EXCISION OF RIGHT OVARIAN CYST, DR. CHANDLER HAIR AT [facility]   • PARATHYROIDECTOMY  05/2020   • PELVIC LAPAROSCOPY      LSO   • TUBAL ABDOMINAL LIGATION Bilateral 1990    POSTPARTUM   • URETEROSCOPY STENT INSERTION N/A 07/13/2009    WITH STONE EXTRACTION, DR. HARDEEP VILLASENOR AT [facility]        Home Meds:  Medications Prior to Admission   Medication Sig Dispense Refill Last Dose   • acetaminophen (TYLENOL) 500 MG tablet Take 1,000 mg by mouth Every 6 (Six) Hours As Needed for Mild Pain.   9/5/2022 at 2100   • dexamethasone (DECADRON) 4 MG tablet Take 1 tablet by mouth Every 8 (Eight) Hours. 30 tablet 0 9/5/2022 at 2300   • famotidine (Pepcid) 20 MG tablet Take 1 tablet by mouth 2 (Two) Times a Day. 30 tablet 0 9/6/2022 at 0800   • levETIRAcetam (Keppra) 500 MG tablet Take 1 tablet by mouth 2 (Two) Times a Day. 60 tablet 0 9/6/2022 at 0800   • metFORMIN ER (GLUCOPHAGE-XR) 500 MG 24 hr tablet Take 2 tablets by mouth 2 (Two) Times a Day. 360 tablet 1 9/5/2022 at 1900   • pravastatin (Pravachol) 20 MG tablet Take 1 tablet by mouth Daily. 90 tablet 0 9/5/2022 at 0800   • ALPRAZolam (Xanax) 0.25 MG tablet Take 1 tablet by mouth At Night As Needed for Anxiety. 6 tablet 0 NEVER TAKEN   • butalbital-acetaminophen-caffeine (Esgic) -40 MG per tablet Take 1  "tablet by mouth Every 4 (Four) Hours As Needed for Headache. 20 tablet 0 Unknown at Unknown time       Allergies:  No Known Allergies    Social History:   Social History     Socioeconomic History   • Marital status:    Tobacco Use   • Smoking status: Never Smoker   • Smokeless tobacco: Never Used   Vaping Use   • Vaping Use: Never used   Substance and Sexual Activity   • Alcohol use: No   • Drug use: No   • Sexual activity: Yes     Partners: Male     Birth control/protection: Post-menopausal, Surgical     Comment: Tubal ligation       Family History:  Family History   Problem Relation Age of Onset   • Melanoma Mother    • Cancer Mother    • Coronary artery disease Father    • Prostate cancer Father    • Diabetes Father    • Heart disease Father    • No Known Problems Sister    • Cancer Brother    • Melanoma Brother    • Prostate cancer Brother    • No Known Problems Daughter    • No Known Problems Son    • No Known Problems Maternal Aunt    • No Known Problems Paternal Aunt    • Ovarian cancer Maternal Grandmother    • Cancer Maternal Grandmother    • Cancer Paternal Grandmother    • BRCA 1/2 Neg Hx    • Breast cancer Neg Hx    • Colon cancer Neg Hx    • Endometrial cancer Neg Hx    • Uterine cancer Neg Hx    • Malig Hyperthermia Neg Hx        Physical Exam:  /74   Pulse 63   Temp 97.4 °F (36.3 °C) (Oral)   Resp 17   Ht 170.2 cm (67\")   Wt 83.1 kg (183 lb 3.2 oz)   SpO2 94%   BMI 28.69 kg/m²  Body mass index is 28.69 kg/m². 94% 83.1 kg (183 lb 3.2 oz)  Physical Exam  Patient is examined using the personal protective equipment as per guidelines from infection control for this particular patient as enacted.  Hand hygiene was performed before and after patient interaction.  Well-developed normal body habitus  Eyes normal conjunctivae and pupils reactive to light  ENT Mallampati between 3 and 4 normal nasal exam  Neck midline trachea no thyromegaly  Chest no labored breathing clear  CVS regular rate " and rhythm no lower extremity edema  Abdomen soft nontender no hepatosplenomegaly  CNS intact normal sensory exam  Skin no rashes no nodules  Psych oriented to time place and person normal memory  Musculoskeletal no cyanosis no clubbing normal range of motion        LABS:  Lab Results   Component Value Date    CALCIUM 9.2 08/31/2022    PHOS 3.3 08/31/2022     Results from last 7 days   Lab Units 08/31/22  0600   SODIUM mmol/L 142   POTASSIUM mmol/L 4.3   CHLORIDE mmol/L 108*   CO2 mmol/L 23.6   BUN mg/dL 14   CREATININE mg/dL 0.66   GLUCOSE mg/dL 143*   CALCIUM mg/dL 9.2   WBC 10*3/mm3 12.20*   HEMOGLOBIN g/dL 12.9   PLATELETS 10*3/mm3 234     Lab Results   Component Value Date    TROPONINT <0.010 08/29/2022                                 Lab Results   Component Value Date    TSH 1.050 09/11/2019     Estimated Creatinine Clearance: 99.2 mL/min (by C-G formula based on SCr of 0.66 mg/dL).         Imaging: I personally visualized the images of scans/x-rays performed within last 3 days.      Assessment:  Brain tumor (HCC)  Status post stereotactic brain biopsy  Diabetes mellitus  Leukocytosis      Recommendations:  ICU core measures  Neurochecks per protocol  Blood glucose monitoring per ICU protocol  Leukocytosis likely from steroids  Will follow in the ICU only            Goyo Ortiz MD  9/6/2022  22:33 EDT      Much of this encounter note is an electronic transcription/translation of spoken language to printed text using Dragon Software.    Electronically signed by Goyo Ortiz MD at 09/06/22 7936

## 2022-09-07 NOTE — PROGRESS NOTES
Discharge Planning Assessment  [facility]     Patient Name: Ai Carreon  MRN: [id number]  Today's Date: 9/7/2022    Admit Date: 9/6/2022     Discharge Needs Assessment     Row Name 09/07/22 1504       Living Environment    People in Home spouse    Current Living Arrangements home    Potentially Unsafe Housing Conditions unable to assess    Primary Care Provided by self    Provides Primary Care For no one    Family Caregiver if Needed child(razia), adult;spouse    Family Caregiver Names  Landry, daughter Verenice and son Kulwant    Quality of Family Relationships supportive;involved    Able to Return to Prior Arrangements yes       Resource/Environmental Concerns    Resource/Environmental Concerns none    Home Accessibility Concerns none    Transportation Concerns none       Transition Planning    Patient/Family Anticipates Transition to home with family    Patient/Family Anticipated Services at Transition none    Transportation Anticipated family or friend will provide       Discharge Needs Assessment    Equipment Currently Used at Home none    Concerns to be Addressed discharge planning    Anticipated Changes Related to Illness none    Equipment Needed After Discharge none               Discharge Plan     Row Name 09/07/22 1506       Plan    Plan Home denies needs    Plan Comments CCP spoke to patient at bedside. CCP role explained  Discharge planning discussed.  Face sheet verified.  Pt lives with her  in a two-story house.  She uses no DME to ambulate.  She has no history of HH or rehab stays.  She obtains her medications from [facility] on [address].  Her PCP is Dr. Shanelle Lopez.  Plan is home  Family assists her as needed.  CCP following    Final Discharge Disposition Code 01 - home or self-care              Continued Care and Services - Discharged on 9/7/2022 Admission date: 9/6/2022 - Discharge disposition: Home or Self Care   Coordination has not been started for this encounter.       Expected  Discharge Date and Time     Expected Discharge Date Expected Discharge Time    Sep 7, 2022          Demographic Summary    No documentation.                Functional Status    No documentation.                Psychosocial    No documentation.                Abuse/Neglect    No documentation.                Legal    No documentation.                Substance Abuse    No documentation.                Patient Forms    No documentation.                   Karla Swan RN

## 2022-09-07 NOTE — DISCHARGE SUMMARY
Ai MCKINLEY Velma  1960    Patient Care Team:  Shanelle Mathis MD as PCP - General (Family Medicine)  Inderjit Kramer MD as Consulting Physician (Radiation Oncology)    Date of Admit: 9/6/2022    Date of Discharge:  9/7/2022    Discharge Diagnosis:  Brain tumor (HCC)      Procedures Performed  Procedure(s):  RIGHT STEREOTACTIC BRAIN BIOPSY WITH STEALTH       Complications: None    Consultants:   Consults     Date and Time Order Name Status Description    9/7/2022  9:58 AM Hematology & Oncology Inpatient Consult Completed     8/31/2022 11:34 AM Inpatient Radiation Oncology Consult Completed     8/29/2022  4:12 PM Neurosurgery (on-call MD unless specified) Completed           Condition on Discharge: stable    Discharge disposition: home    Pertinent Test Results: Brain biopsy path    Brief HPI: Patient seen and evaluated as an inpatient in the hospital in late August for complaints of headaches and intermittent mental status changes for approximately 2 weeks.  Brain MRI imaging revealed right frontal lobe brain mass, highly suspicious for GBM.  Patient was subsequently discharged home on 8/31/22 to follow-up as an outpatient for right stereotactic brain biopsy on 9/6/2022.  RBAs of treatment were discussed including the above procedure. Patient consented to above procedure.    Hospital Course: Patient admitted for above procedure. The procedure itself was without complication. The patient was transferred to ICU following recovery.  Today, the patient appears to be doing well but does complain of a mild intermittent headache.  Neurological exam is unremarkable.  During hospitalization, the patient was seen by hematology-oncology as well as radiation-oncology and follow-up has been established.  With regard to current steroid dosing, advised the patient to follow-up with the oncology team as they will most likely assume care of this and make changes as appropriate.  All discharge instructions given to and gone over  with the patient and her family members at bedside and all questions answered at this time.  Patient has a follow-up appointment scheduled with Dr. Billingsley on 9/21/2022 for postop wound check.     Discharge Physical Exam:    Temp:  [97.4 °F (36.3 °C)-98.4 °F (36.9 °C)] 98.1 °F (36.7 °C)  Heart Rate:  [45-79] 73  Resp:  [16-17] 17  BP: (100-152)/(55-81) 100/72  Arterial Line BP: (117-179)/(54-78) 148/73    Current labs:  Lab Results (last 24 hours)     Procedure Component Value Units Date/Time    Tissue Pathology Exam [[id number]] Collected: 09/06/22 1727    Specimen: Tissue from Brain, Tissue from Brain Updated: 09/06/22 2202    POC Glucose Once [[id number]]  (Abnormal) Collected: 09/06/22 1909    Specimen: Blood Updated: 09/06/22 1911     Glucose 137 mg/dL      Comment: Meter: [id number] : [id number] Jessie Powell RN       POC Glucose Once [[id number]]  (Abnormal) Collected: 09/06/22 2200    Specimen: Blood Updated: 09/06/22 2201     Glucose 196 mg/dL      Comment: Meter: [id number] : [id number] Jose Laureano RN       POC Glucose Once [[id number]]  (Abnormal) Collected: 09/07/22 0603    Specimen: Blood Updated: 09/07/22 0605     Glucose 187 mg/dL      Comment: Meter: [id number] : [id number] Dario Baker RN               General Appearance No acute distress   HEENT NC/AT   Neurological Awake, Alert, and oriented x 3.  PERRLA 3 mm and brisk.  Speech intact with no evidence of aphasia or dysarthria.  Moves all extremities equally and is able to follow all commands throughout.   Cranial nerves CN II-XII intact   Motor Strength and tone equal bilaterally throughout.   Sensory Intact to light touch bilaterally throughout.   Gait and station Ambulating with minimal assistance.   Neck Supple, trachea midline, no carotid bruit.   Extremities Moves all extremities well, no edema, no cyanosis, no redness   Skin Right frontal brain biopsy site is noted to be well approximated with skin glue in place.  There is no  evidence of redness, swelling, or drainage.     Discharge Medications  Tomás has been reviewed and narcotic consent is on file in the patient's chart.     Your medication list      ASK your doctor about these medications      Instructions Last Dose Given Next Dose Due   acetaminophen 500 MG tablet  Commonly known as: TYLENOL      Take 1,000 mg by mouth Every 6 (Six) Hours As Needed for Mild Pain.       ALPRAZolam 0.25 MG tablet  Commonly known as: Xanax      Take 1 tablet by mouth At Night As Needed for Anxiety.       butalbital-acetaminophen-caffeine -40 MG per tablet  Commonly known as: Esgic      Take 1 tablet by mouth Every 4 (Four) Hours As Needed for Headache.       dexamethasone 4 MG tablet  Commonly known as: DECADRON      Take 1 tablet by mouth Every 8 (Eight) Hours.       famotidine 20 MG tablet  Commonly known as: Pepcid      Take 1 tablet by mouth 2 (Two) Times a Day.       levETIRAcetam 500 MG tablet  Commonly known as: Keppra      Take 1 tablet by mouth 2 (Two) Times a Day.       metFORMIN  MG 24 hr tablet  Commonly known as: GLUCOPHAGE-XR      Take 2 tablets by mouth 2 (Two) Times a Day.       pravastatin 20 MG tablet  Commonly known as: Pravachol      Take 1 tablet by mouth Daily.              Discharge Diet:     Diet Order   Procedures   • Diet Regular       Activity at Discharge:   Brain Biopsy Post-Operative Instructions           1. Sleep and rest with your head at a 30° angle of elevation.  This helps keep brain swelling down and prevents increased pressure in the head.  This is most important immediately after surgery.  You can sleep flat after the first week.     2. Contact your surgeon immediately if you develop a persistent drainage from the incision, this could be a cerebrospinal fluid (CSF)  leak and needs to be managed quickly.     3. You may resume your usual diet as you tolerate     4. No lifting overhead, although you may place your arms above your head.  DO NOT lift  anything over five (5) pounds.  Walking is allowed and encouraged. There are no restrictions on sitting or climbing stairs, but refrain from overly strenuous activity and take it slow and easy.  You may notice that a constant position (standing or sitting) greater than 45 minutes may tend to make you more sore or tired than normal and therefore it is recommended that you change positions frequently. Do not drive until you are seen back for your first postoperative visit, although you may be a passenger in a car.       5. You do not have to keep a dressing on the incision but you should cover the incision with a hat, bandana, or scarf when you leave your home. Make sure your head covering is loose and breathable.     6. Refrain from any sexual activity until after your first evaluation at the office.      7. You may shower and pat the incision dry, but do not soak your wound in water such as a swimming pool, hot tub or lake.   Avoid direct sunlight to the wound for at least three (3) months.  You may apply ice to the surgical site for 15 to 20 minutes each hour while awake for the first few days following surgery.  Simply put the ice in a plastic bag in place a towel between the bag of ice and your skin.     8. You will leave the hospital with prescriptions for pain medicine.  These medications must be taken as prescribed only.  If a refill of your pain medication is required, in order to have this medication refilled, you must contact the office 3 days (72 hrs) prior to running out, but the amount prescribed is generally enough to last until the first post-operative visit.       9. A small amount of bleeding from the incision during the first few days is not unusual.       10.  You should have a post-operative visit approximately 2 weeks after surgery.  If you do not have a scheduled appointment, call the office at [phone] to schedule one.  We will discuss return to work at your first post-operative visit, but you  "can expect to be off of work for an average of 6 weeks.      11. Notify the office if there are any problems, such as:     a. Excessive incision pain, headache, nausea or vomiting  b. Persistent temperature of 101.5° F (38.6 ° C) or greater that is not relieved by Tylenol.  c. Excessive bleeding, redness, heat, leakage of fluid, swelling or pus around the incision   d. If you have difficulty breathing, have chest pain or shortness of breath, call 911.   e. You develop swelling in the calf or leg  f. Your pain is not controlled with medication  g. You seem to be getting worse rather than better      Call for: questions or concerns    Follow-up Appointments  Future Appointments   Date Time Provider Department Center   9/21/2022 10:30 AM Flavio Billingsley MD [facility]   12/12/2022  9:00 AM Shanelle Mathis MD [id number]           Test Results Pending at Discharge  Pending Labs     Order Current Status    Tissue Pathology Exam In process         None    I discussed the discharge instructions with patient, family, nursing staff and Dr. Analilia Cabrera, APRN  09/07/22  13:13 EDT    35 min spent in reviewing records, discussion and examination of the patient and discussion with other members of the patient's medical team.    \"Dictated utilizing Dragon dictation\".    "

## 2022-09-08 ENCOUNTER — TELEPHONE (OUTPATIENT)
Dept: ONCOLOGY | Facility: CLINIC | Age: 62
End: 2022-09-08

## 2022-09-08 ENCOUNTER — TRANSITIONAL CARE MANAGEMENT TELEPHONE ENCOUNTER (OUTPATIENT)
Dept: CALL CENTER | Facility: HOSPITAL | Age: 62
End: 2022-09-08

## 2022-09-08 DIAGNOSIS — G93.89 FRONTAL MASS OF BRAIN: Primary | ICD-10-CM

## 2022-09-08 NOTE — TELEPHONE ENCOUNTER
Returned call to patient's family member after reviewing discharge summary.  Patient is to continue the Decadron 4 mg every 8 hours till seen by Dr. Isaacs in office.  She v/u.

## 2022-09-08 NOTE — OUTREACH NOTE
Prep Survey    Flowsheet Row Responses   [facility] patient discharged from? [place]   Is LACE score < 7 ? No   Emergency Room discharge w/ pulse ox? No   Eligibility [facility]   Date of Admission 09/06/22   Date of Discharge 09/07/22   Discharge Disposition Home or Self Care   Discharge diagnosis Brain tumor,    RIGHT STEREOTACTIC BRAIN BIOPSY WITH STEALTH   Does the patient have one of the following disease processes/diagnoses(primary or secondary)? General Surgery   Does the patient have Home health ordered? No   Is there a DME ordered? No   Prep survey completed? Yes          ERIN SMITH - Registered Nurse

## 2022-09-08 NOTE — OUTREACH NOTE
Call Center TCM Note    Flowsheet Row Responses   [facility] patient discharged from? [place]   Does the patient have one of the following disease processes/diagnoses(primary or secondary)? General Surgery   TCM attempt successful? Yes   Call start time 0838   Call end time 0846   Discharge diagnosis Brain tumor,    RIGHT STEREOTACTIC BRAIN BIOPSY WITH STEALTH   Person spoke with today (if not patient) and relationship Patient   Meds reviewed with patient/caregiver? Yes   Is the patient having any side effects they believe may be caused by any medication additions or changes? No   Does the patient have all medications related to this admission filled (includes all antibiotics, pain medications, etc.) Yes   Is the patient taking all medications as directed (includes completed medication regime)? Yes   Comments Post-Op appt on 9/21/22 at 10:30 AM   Psychosocial issues? No   What is the patient's perception of their health status since discharge? Improving   Nursing interventions Nurse provided patient education   Is the patient /caregiver able to teach back basic post-op care? Take showers only when approved by MD-sponge bathe until then, No tub bath, swimming, or hot tub until instructed by MD, Lifting as instructed by MD in discharge instructions, Keep incision areas clean,dry and protected   Is the patient/caregiver able to teach back signs and symptoms of incisional infection? Increased redness, swelling or pain at the incisonal site, Increased drainage or bleeding, Incisional warmth, Pus or odor from incision, Fever   Is the patient/caregiver able to teach back steps to recovery at home? Rest and rebuild strength, gradually increase activity, Eat a well-balance diet   TCM call completed? Yes   Wrap up additional comments Pt states she is doing ok, and denies fever, increased redness, drainage, warmth at incisional site. RN routed message to PCP office r/t no appts available that satisfy TCM guidelines.  Pt verifed Post-Op appt on 9/21/22, and advised to make Oncologist fu appt. Pt inquired about if she should be taking dexamethasone,  reveiwed AVS/medications w/pt, and dexamethasone listed to continue to take. Pt advised to consult Oncology to inquire if steriods should be cont. Pt/spouse verbalized understanding.          Yenny Harry RN    9/8/2022, 08:57 EDT

## 2022-09-09 ENCOUNTER — TELEPHONE (OUTPATIENT)
Dept: NEUROSURGERY | Facility: CLINIC | Age: 62
End: 2022-09-09

## 2022-09-09 NOTE — TELEPHONE ENCOUNTER
I called Elvia and informed him of the pathology results that were posted today.  They have an appointment with an oncologist on the 22nd which I think is perfect timing and they will see me around that time as well so that we make sure her wound has healed up well prior to starting any sort of treatment.  By that time molecular subtyping should be completed from the [facility].

## 2022-09-10 DIAGNOSIS — E78.2 MIXED HYPERLIPIDEMIA: ICD-10-CM

## 2022-09-12 ENCOUNTER — TELEPHONE (OUTPATIENT)
Dept: NEUROSURGERY | Facility: CLINIC | Age: 62
End: 2022-09-12

## 2022-09-12 RX ORDER — PRAVASTATIN SODIUM 20 MG
TABLET ORAL
Qty: 90 TABLET | Refills: 0 | Status: SHIPPED | OUTPATIENT
Start: 2022-09-12 | End: 2022-12-09

## 2022-09-13 ENCOUNTER — TELEPHONE (OUTPATIENT)
Dept: NEUROSURGERY | Facility: CLINIC | Age: 62
End: 2022-09-13

## 2022-09-13 NOTE — TELEPHONE ENCOUNTER
Dr. Lucien Whitehead's office would like to have pathology results faxed over on patient. She has an appointment scheduled today at 1pm with Radiation Oncology at [facility].      Fax [phone]     Phone [phone] ext [phone]    Patient is scheduled 9/21/22 with Dr Billingsley.

## 2022-09-14 ENCOUNTER — TELEPHONE (OUTPATIENT)
Dept: NEUROSURGERY | Facility: CLINIC | Age: 62
End: 2022-09-14

## 2022-09-14 NOTE — TELEPHONE ENCOUNTER
Patients  called and would like for DR. Billingsley to  Call him today. He did not want to leave a message except for it is in regards to pathology reports.     I spoke with Landry Carreon over the phone earlier and he expressed to me his frustration and concern that the timeline of her radiation and diagnosis has not been quicker.  He is frustrated that the pathology sample was not sent off to the [facility] until September 9, 3 days after her surgery.  And he indicates that he spoke with a specialist at [facility] who has suggested that he start chemotherapy and radiation this week, which is different from what I recommended.    I spoke with our pathology department who then got in touch with the [facility] pathology department and they indicated that everything is going smoothly and per protocol.  The most diagnostic appearing sample was sent off after initial evaluation and [place] has received it and are working on molecular subtyping for the tumor.  At this point they have nothing to report and does typically take them 1 to 2 weeks for this process.    They were scheduled to see Dr. Auguste on September 22 but have canceled that appointment and have sought a second opinion at [facility].  Currently they do have an appointment with me on September 21 for a wound check.  I believe its safest to let the wound fully heal for at least 2 weeks prior to starting chemotherapy and radiation to decrease her risk of infection which may delay her treatment further and more impactfully.    I will continue to follow her pathology results as we hear more.    Flavio Billingsley MD  09/14/22  12:25 EDT

## 2022-09-15 NOTE — TELEPHONE ENCOUNTER
I called and s/w  letting him know that we faxed pathology report was faxed to Dr. Olmedo's office.

## 2022-09-21 ENCOUNTER — OFFICE VISIT (OUTPATIENT)
Dept: NEUROSURGERY | Facility: CLINIC | Age: 62
End: 2022-09-21

## 2022-09-21 VITALS
WEIGHT: 177 LBS | OXYGEN SATURATION: 97 % | HEART RATE: 63 BPM | RESPIRATION RATE: 18 BRPM | BODY MASS INDEX: 27.78 KG/M2 | HEIGHT: 67 IN | DIASTOLIC BLOOD PRESSURE: 70 MMHG | SYSTOLIC BLOOD PRESSURE: 114 MMHG

## 2022-09-21 DIAGNOSIS — C71.9 GBM (GLIOBLASTOMA MULTIFORME): Primary | ICD-10-CM

## 2022-09-21 PROCEDURE — 99024 POSTOP FOLLOW-UP VISIT: CPT | Performed by: NEUROLOGICAL SURGERY

## 2022-09-21 NOTE — PROGRESS NOTES
Patient ID: Ai Carreon is a 62 y.o. female is an established patient who was seen in the hospital for evaluation of a brain tumor who has previously undergone brain biopsy on 9/6/22.    Subjective:     History of Present Illness  Ai has been doing well since surgery.  She denies any issues with her incision and has not had any issues keeping clean and dry.  She is scheduled to start therapy with Dr. Olmedo at [place] on the 26th of this month.  She denies any headache or nausea or balance issues or any other major neurological changes.  She did notice some change in activity level as her steroids were tapered which is normal and expected.      While in the room and during my examination of the patient I wore a mask and eye protection.  I washed my hands before and after this patient encounter.  The patient was also wearing a mask.    The following portions of the patient's history were reviewed and updated as appropriate: allergies, current medications, past family history, past medical history, past social history, past surgical history and problem list.     Objective:    There were no vitals filed for this visit.  There is no height or weight on file to calculate BMI.    Physical Exam  Constitutional:       Appearance: Normal appearance.   HENT:      Head: Normocephalic and atraumatic.   Eyes:      Extraocular Movements: Extraocular movements intact.      Conjunctiva/sclera: Conjunctivae normal.      Pupils: Pupils are equal, round, and reactive to light.   Cardiovascular:      Rate and Rhythm: Normal rate and regular rhythm.      Pulses: Normal pulses.   Pulmonary:      Breath sounds: Normal breath sounds.   Abdominal:      Palpations: Abdomen is soft.   Musculoskeletal:         General: Normal range of motion.      Cervical back: Normal range of motion and neck supple.   Skin:     General: Skin is warm and dry.   Neurological:      Mental Status: She is alert and oriented to person, place, and time.       Cranial Nerves: Cranial nerves are intact.      Motor: Motor function is intact. No weakness or atrophy.      Coordination: Coordination is intact. Romberg sign negative. Romberg Test normal.      Gait: Gait is intact. Gait normal.      Deep Tendon Reflexes: Reflexes are normal and symmetric.      Reflex Scores:       Tricep reflexes are 2+ on the right side and 2+ on the left side.       Bicep reflexes are 2+ on the right side and 2+ on the left side.       Brachioradialis reflexes are 2+ on the right side and 2+ on the left side.       Patellar reflexes are 2+ on the right side and 2+ on the left side.       Achilles reflexes are 2+ on the right side and 2+ on the left side.  Psychiatric:         Speech: Speech normal.       Neurologic Exam     Mental Status   Oriented to person, place, and time.   Attention: normal. Concentration: normal.   Speech: speech is normal   Level of consciousness: alert    Cranial Nerves   Cranial nerves II through XII intact.     CN III, IV, VI   Pupils are equal, round, and reactive to light.    Motor Exam   Muscle bulk: normal  Overall muscle tone: normal    Strength   Strength 5/5 except as noted.     Sensory Exam   Light touch normal.     Gait, Coordination, and Reflexes     Gait  Gait: normal    Coordination   Romberg: negative    Reflexes   Reflexes 2+ except as noted.   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  Her skin incision is healing and she should be ready to proceed with radiation and chemotherapy.  I will message Dr. Olmedo and let her know.       There are no diagnoses linked to this encounter.            Belkis FITZGERALD  RASHAAD Mitchell  09/21/22  11:14 EDT

## 2022-09-22 ENCOUNTER — APPOINTMENT (OUTPATIENT)
Dept: LAB | Facility: HOSPITAL | Age: 62
End: 2022-09-22

## 2022-09-22 ENCOUNTER — NURSE NAVIGATOR (OUTPATIENT)
Dept: OTHER | Facility: HOSPITAL | Age: 62
End: 2022-09-22

## 2022-09-22 NOTE — PROGRESS NOTES
Initial Nurse Navigator Assessment: Referral received from Dr. Isaacs. Attempted to contact patient multiple times. Left several voice messages. Spoke with patient and spouse per phone. Introduced self and explained role. Noted in patient's chart she is being followed at [facility]. Call placed to Dr. Isaacs to confirm. Will sign off.....Xiao Etienne RN, Oncology Nurse Navigator

## 2022-10-24 ENCOUNTER — TELEPHONE (OUTPATIENT)
Dept: FAMILY MEDICINE CLINIC | Facility: CLINIC | Age: 62
End: 2022-10-24

## 2022-10-24 NOTE — TELEPHONE ENCOUNTER
Dominik Tan with [facility] calling regarding patient's blood sugar of 435 / patient is on metformin at this time & is currently on a steroid for her brain tumor, all records are in epic     Please call patient with any instructions for possible sliding scale insulin or secondary treatment for her sugars

## 2022-10-31 ENCOUNTER — OFFICE VISIT (OUTPATIENT)
Dept: FAMILY MEDICINE CLINIC | Facility: CLINIC | Age: 62
End: 2022-10-31

## 2022-10-31 ENCOUNTER — TELEPHONE (OUTPATIENT)
Dept: FAMILY MEDICINE CLINIC | Facility: CLINIC | Age: 62
End: 2022-10-31

## 2022-10-31 VITALS
DIASTOLIC BLOOD PRESSURE: 68 MMHG | OXYGEN SATURATION: 98 % | BODY MASS INDEX: 26.37 KG/M2 | HEIGHT: 67 IN | HEART RATE: 68 BPM | WEIGHT: 168 LBS | SYSTOLIC BLOOD PRESSURE: 116 MMHG | TEMPERATURE: 98.6 F

## 2022-10-31 DIAGNOSIS — E11.9 TYPE 2 DIABETES MELLITUS WITHOUT COMPLICATION, WITHOUT LONG-TERM CURRENT USE OF INSULIN: Primary | ICD-10-CM

## 2022-10-31 PROCEDURE — 99213 OFFICE O/P EST LOW 20 MIN: CPT | Performed by: FAMILY MEDICINE

## 2022-10-31 NOTE — PROGRESS NOTES
"Chief Complaint  Diabetes    Subjective        Ai Carreon presents to [facility]  History of Present Illness  Diabetes x 2 years, Type 2,  Dad with DM, BS at home was under control , GBM since last year, on Steroids , BS was over 400, was given Insulin last Friday now , 254, she is only taking metformin nothing else, also using Keppra but no history of seizures, according to patient and her , she would be on Decadron for short period time, they are actually tapering down the dose, her last A1c was less than 2 months ago and it was perfect, non smoker  Objective   Vital Signs:  /68 (BP Location: Right arm, Patient Position: Sitting, Cuff Size: Adult)   Pulse 68   Temp 98.6 °F (37 °C) (Temporal)   Ht 170.2 cm (67.01\")   Wt 76.2 kg (168 lb)   SpO2 98%   BMI 26.31 kg/m²   Estimated body mass index is 26.31 kg/m² as calculated from the following:    Height as of this encounter: 170.2 cm (67.01\").    Weight as of this encounter: 76.2 kg (168 lb).          Physical Exam  Vitals and nursing note reviewed.   Constitutional:       General: She is not in acute distress.     Appearance: Normal appearance. She is well-developed. She is not ill-appearing, toxic-appearing or diaphoretic.   HENT:      Head: Normocephalic and atraumatic.      Right Ear: Tympanic membrane, ear canal and external ear normal. There is no impacted cerumen.      Left Ear: Tympanic membrane, ear canal and external ear normal. There is no impacted cerumen.      Nose: Nose normal. No congestion or rhinorrhea.      Mouth/Throat:      Mouth: Mucous membranes are moist.      Pharynx: Oropharynx is clear. No oropharyngeal exudate or posterior oropharyngeal erythema.   Eyes:      General: No scleral icterus.        Right eye: No discharge.         Left eye: No discharge.      Extraocular Movements: Extraocular movements intact.      Conjunctiva/sclera: Conjunctivae normal.      Pupils: Pupils are equal, round, " and reactive to light.   Neck:      Thyroid: No thyromegaly.      Vascular: No carotid bruit or JVD.      Trachea: No tracheal deviation.   Cardiovascular:      Rate and Rhythm: Normal rate and regular rhythm.      Heart sounds: Normal heart sounds. No murmur heard.    No friction rub. No gallop.   Pulmonary:      Effort: Pulmonary effort is normal. No respiratory distress.      Breath sounds: Normal breath sounds. No stridor. No wheezing, rhonchi or rales.   Chest:      Chest wall: No tenderness.   Abdominal:      General: Bowel sounds are normal. There is no distension.      Palpations: Abdomen is soft. There is no mass.      Tenderness: There is no abdominal tenderness. There is no right CVA tenderness, left CVA tenderness, guarding or rebound.      Hernia: No hernia is present.   Musculoskeletal:         General: Normal range of motion.      Cervical back: Normal range of motion and neck supple. No rigidity or tenderness.      Right lower leg: No edema.      Left lower leg: No edema.   Lymphadenopathy:      Cervical: No cervical adenopathy.   Skin:     General: Skin is warm and dry.      Coloration: Skin is not jaundiced.   Neurological:      General: No focal deficit present.      Mental Status: She is alert and oriented to person, place, and time. Mental status is at baseline.      Sensory: No sensory deficit.      Motor: No weakness or abnormal muscle tone.      Coordination: Coordination normal.      Gait: Gait normal.      Deep Tendon Reflexes: Reflexes normal.   Psychiatric:         Mood and Affect: Mood normal.         Behavior: Behavior normal.         Thought Content: Thought content normal.         Judgment: Judgment normal.        Result Review :                Assessment and Plan   Diagnoses and all orders for this visit:    1. Type 2 diabetes mellitus without complication, without long-term current use of insulin ([facility]) (Primary)  -     dapagliflozin (FARXIGA) 5 MG tablet tablet; Take 1 tablet by  mouth Daily.  Dispense: 90 tablet; Refill: 3      Continue metformin, also prescription given for CGM krishna 2 with samples, discussed with patient has been risk of UTI and yeast infection with Farxiga       Follow Up   No follow-ups on file.  Patient was given instructions and counseling regarding her condition or for health maintenance advice. Please see specific information pulled into the AVS if appropriate.

## 2022-10-31 NOTE — TELEPHONE ENCOUNTER
I returned 's call, regarding question about the glucometer, he misunderstood, about the sample, I told him to use the sample first and then if  he did like to continue with  the prescription but he already went ahead and gt prescription for the krishna

## 2022-11-01 LAB
DX PRELIMINARY: NORMAL
LAB AP CASE REPORT: NORMAL
LAB AP DIAGNOSIS COMMENT: NORMAL
Lab: NORMAL
PATH REPORT.ADDENDUM SPEC: NORMAL
PATH REPORT.FINAL DX SPEC: NORMAL
PATH REPORT.GROSS SPEC: NORMAL

## 2022-11-02 DIAGNOSIS — E11.9 TYPE 2 DIABETES MELLITUS WITHOUT COMPLICATION, WITHOUT LONG-TERM CURRENT USE OF INSULIN: ICD-10-CM

## 2022-11-02 NOTE — TELEPHONE ENCOUNTER
Rx Refill Note  Requested Prescriptions     Pending Prescriptions Disp Refills   • dapagliflozin (FARXIGA) 5 MG tablet tablet 90 tablet 3     Sig: Take 1 tablet by mouth Daily.      Last office visit with prescribing clinician: 6/13/2022      Next office visit with prescribing clinician: 12/12/2022  New pharmacy

## 2022-11-02 NOTE — TELEPHONE ENCOUNTER
PT'S  CALLED AND STATED THE REASON THIS NEEDS TO BE SENT TO A NEW PHARMACY IS BECAUSE THE ORIGINAL PHARMACY ([facility]) WAS OUT OF STOCK AND THEY COULDN'T TRANSFER INTERNALLY BECAUSE IT WAS ALREADY PENDING.

## 2022-11-02 NOTE — TELEPHONE ENCOUNTER
Caller: [facility]/pharmacy #[id number] - [facility], [place] - [address] AT [facility] [phone] [facility] [phone] FX    Relationship: Pharmacy    Best call back number: [phone]    Requested Prescriptions:   Requested Prescriptions     Pending Prescriptions Disp Refills   • dapagliflozin (FARXIGA) 5 MG tablet tablet 90 tablet 3     Sig: Take 1 tablet by mouth Daily.        Pharmacy where request should be sent: [facility]/PHARM[facility]Y #[id number] - [facility], [place] - [address] AT [facility] [phone] [facility] [phone] FX     Does the patient have less than a 3 day supply:  [x] Yes  [] No    Elena Plasencia Rep   11/02/22 11:42 EDT

## 2022-11-07 DIAGNOSIS — E11.9 TYPE 2 DIABETES MELLITUS WITHOUT COMPLICATION, WITHOUT LONG-TERM CURRENT USE OF INSULIN: ICD-10-CM

## 2022-11-08 ENCOUNTER — TELEPHONE (OUTPATIENT)
Dept: FAMILY MEDICINE CLINIC | Facility: CLINIC | Age: 62
End: 2022-11-08

## 2022-11-08 NOTE — TELEPHONE ENCOUNTER
Caller: MAIK    Relationship to patient: RN [employer]    Best call back number: [phone]   EXT. [phone]  MON-FRIDAY  Patient is needing: SHE CALLED TO OFFER SERVICES TO PATIENT SO COULD SOMEONE INFORM   THE PATIENT OF THIS NEXT TIME SHE COMES IN.

## 2022-11-21 ENCOUNTER — OFFICE VISIT (OUTPATIENT)
Dept: FAMILY MEDICINE CLINIC | Facility: CLINIC | Age: 62
End: 2022-11-21

## 2022-11-21 ENCOUNTER — HOSPITAL ENCOUNTER (OUTPATIENT)
Dept: GENERAL RADIOLOGY | Facility: HOSPITAL | Age: 62
Discharge: HOME | End: 2022-11-21
Admitting: NURSE PRACTITIONER

## 2022-11-21 VITALS
OXYGEN SATURATION: 97 % | TEMPERATURE: 98.3 F | DIASTOLIC BLOOD PRESSURE: 78 MMHG | HEIGHT: 67 IN | HEART RATE: 90 BPM | WEIGHT: 168 LBS | SYSTOLIC BLOOD PRESSURE: 116 MMHG | BODY MASS INDEX: 26.37 KG/M2

## 2022-11-21 DIAGNOSIS — C71.1 GLIOBLASTOMA OF BOTH FRONTAL LOBES: ICD-10-CM

## 2022-11-21 DIAGNOSIS — E11.9 TYPE 2 DIABETES MELLITUS WITHOUT COMPLICATION, WITHOUT LONG-TERM CURRENT USE OF INSULIN: ICD-10-CM

## 2022-11-21 DIAGNOSIS — R05.1 ACUTE COUGH: ICD-10-CM

## 2022-11-21 DIAGNOSIS — J20.9 ACUTE BRONCHITIS, UNSPECIFIED ORGANISM: Primary | ICD-10-CM

## 2022-11-21 LAB
EXPIRATION DATE: NORMAL
FLUAV AG UPPER RESP QL IA.RAPID: NOT DETECTED
FLUBV AG UPPER RESP QL IA.RAPID: NOT DETECTED
INTERNAL CONTROL: NORMAL
Lab: NORMAL
SARS-COV-2 AG UPPER RESP QL IA.RAPID: NOT DETECTED

## 2022-11-21 PROCEDURE — 99214 OFFICE O/P EST MOD 30 MIN: CPT | Performed by: NURSE PRACTITIONER

## 2022-11-21 PROCEDURE — 87428 SARSCOV & INF VIR A&B AG IA: CPT | Performed by: NURSE PRACTITIONER

## 2022-11-21 PROCEDURE — 71046 X-RAY EXAM CHEST 2 VIEWS: CPT

## 2022-11-21 RX ORDER — DOXYCYCLINE 100 MG/1
100 CAPSULE ORAL 2 TIMES DAILY
Qty: 14 CAPSULE | Refills: 0 | Status: SHIPPED | OUTPATIENT
Start: 2022-11-21 | End: 2022-11-28

## 2022-11-21 RX ORDER — DEXTROMETHORPHAN POLISTIREX 30 MG/5ML
10 SUSPENSION ORAL EVERY 12 HOURS PRN
Qty: 148 ML | Refills: 0 | Status: SHIPPED | OUTPATIENT
Start: 2022-11-21 | End: 2022-11-22

## 2022-11-21 NOTE — PATIENT INSTRUCTIONS
Get chest x-ray at [facility].  Make sure drinking adequate liquids.  Try Delsym for cough.  Follow up pending x-ray results.  Follow up if symptoms persist or worsen.

## 2022-11-21 NOTE — ASSESSMENT & PLAN NOTE
Get chest x-ray at [facility].  Make sure drinking adequate liquids.  Try Delsym for cough.

## 2022-11-21 NOTE — ASSESSMENT & PLAN NOTE
Diabetes is improving, but not well controlled.   Continue current treatment regimen.  Diabetes will be reassessed in 1 month.  Continue Metformin twice daily and Farxiga daily.  Follow up as scheduled with endocrine.

## 2022-11-22 DIAGNOSIS — R05.1 ACUTE COUGH: Primary | ICD-10-CM

## 2022-11-22 RX ORDER — BENZONATATE 100 MG/1
100 CAPSULE ORAL 3 TIMES DAILY PRN
Qty: 15 CAPSULE | Refills: 0 | Status: SHIPPED | OUTPATIENT
Start: 2022-11-22

## 2022-11-26 DIAGNOSIS — E11.9 TYPE 2 DIABETES MELLITUS WITHOUT COMPLICATION, WITHOUT LONG-TERM CURRENT USE OF INSULIN: ICD-10-CM

## 2022-11-26 RX ORDER — BLOOD SUGAR DIAGNOSTIC
STRIP MISCELLANEOUS
OUTPATIENT
Start: 2022-11-26

## 2022-11-26 NOTE — TELEPHONE ENCOUNTER
Rx Refill Note  Requested Prescriptions     Pending Prescriptions Disp Refills   • OneTouch Ultra test strip [Pharmacy Med Name: ONE TOUCH ULTRA BLUE TEST STRP]       Sig: USE AS INSTRUCTED USE TO CHECK BLOOD SUGAR DX: E11.9      Last office visit with prescribing clinician: 6/13/2022      Next office visit with prescribing clinician: 12/12/2022            Larry Diallo CMA/LMR  11/26/22, 10:05 EST

## 2022-12-09 DIAGNOSIS — E78.2 MIXED HYPERLIPIDEMIA: ICD-10-CM

## 2022-12-09 RX ORDER — PRAVASTATIN SODIUM 20 MG
TABLET ORAL
Qty: 90 TABLET | Refills: 0 | Status: SHIPPED | OUTPATIENT
Start: 2022-12-09 | End: 2022-12-14 | Stop reason: SDUPTHER

## 2022-12-12 ENCOUNTER — OFFICE VISIT (OUTPATIENT)
Dept: FAMILY MEDICINE CLINIC | Facility: CLINIC | Age: 62
End: 2022-12-12

## 2022-12-12 VITALS
SYSTOLIC BLOOD PRESSURE: 110 MMHG | HEIGHT: 67 IN | OXYGEN SATURATION: 97 % | DIASTOLIC BLOOD PRESSURE: 78 MMHG | BODY MASS INDEX: 26.74 KG/M2 | HEART RATE: 76 BPM | TEMPERATURE: 97.7 F | WEIGHT: 170.4 LBS

## 2022-12-12 DIAGNOSIS — E11.9 TYPE 2 DIABETES MELLITUS WITHOUT COMPLICATION, WITHOUT LONG-TERM CURRENT USE OF INSULIN: Primary | ICD-10-CM

## 2022-12-12 DIAGNOSIS — C71.1 GLIOBLASTOMA OF BOTH FRONTAL LOBES: ICD-10-CM

## 2022-12-12 DIAGNOSIS — G93.6 VASOGENIC EDEMA: ICD-10-CM

## 2022-12-12 DIAGNOSIS — E78.2 MIXED HYPERLIPIDEMIA: ICD-10-CM

## 2022-12-12 DIAGNOSIS — E21.3 HYPERPARATHYROIDISM: ICD-10-CM

## 2022-12-12 PROBLEM — D49.6 BRAIN TUMOR (HCC): Status: RESOLVED | Noted: 2022-08-29 | Resolved: 2022-12-12

## 2022-12-12 PROBLEM — G93.89 FRONTAL MASS OF BRAIN: Status: RESOLVED | Noted: 2022-08-29 | Resolved: 2022-12-12

## 2022-12-12 PROCEDURE — 99214 OFFICE O/P EST MOD 30 MIN: CPT | Performed by: FAMILY MEDICINE

## 2022-12-12 RX ORDER — ALBUTEROL SULFATE 90 UG/1
AEROSOL, METERED RESPIRATORY (INHALATION)
COMMUNITY
Start: 2022-11-30

## 2022-12-12 RX ORDER — DAPAGLIFLOZIN 10 MG/1
1 TABLET, FILM COATED ORAL DAILY
Qty: 90 TABLET | Refills: 3 | Status: SHIPPED | OUTPATIENT
Start: 2022-12-12

## 2022-12-12 RX ORDER — FLUOXETINE HYDROCHLORIDE 20 MG/1
20 CAPSULE ORAL DAILY
COMMUNITY
Start: 2022-11-26

## 2022-12-12 RX ORDER — DEXAMETHASONE 2 MG/1
4 TABLET ORAL 2 TIMES DAILY
COMMUNITY
Start: 2022-12-05

## 2022-12-12 NOTE — PROGRESS NOTES
Subjective   iA Carreon is a 62 y.o. female.     Chief Complaint   Patient presents with   • Diabetes     Sugars have been running in the 200s    • Foot Swelling     Both feet for about a week if not longer        History of Present Illness   Diabetes-doing poorly on medication, fasting blood sugar 200's on average.  No side effects.  Has seen a nutritionist and working hard on diet.      hyperlipidemia- no muscle aches and doing well.      High calcium-is seeing ENT.  Had a sestamibi scan. Has had surgery twice and doing well. Has increased her D3 and went down on calcium and just needs annual calcium and vit d from now on per last ent note. She stopped her calcium and vit d per ent.     Patient was diagnosed with glioblastoma of both frontal lobes this year and has had surgery.  She has had follow-up with her neurosurgeon.  Has had chemo and radiation and finished this in November.  She has been on daily steroids which could contribute to her high blood sugars.  Has a referral to endocrinology per oncology per last note with Cari. Has MRI pending next month.     Has known swelling in her feet. Possible SE of chemo. For 2 weeks. Oncology has gotten 2 neg dopplers with no DVT.     Pt reports moods are ok. Is on prozac now.     The following portions of the patient's history were reviewed and updated as appropriate: allergies, current medications, past family history, past medical history, past social history, past surgical history and problem list.    Past Medical History:   Diagnosis Date   • Brain tumor (HCC) 08/29/2022   • Colon polyps     FOLLOWED BY DR. AMARI CASTRO   • Diabetes mellitus (HCC)     TYPE 2   • Hyperglycemia    • Hyperlipidemia     MIXED   • Kidney stone     SEEN IN PAST BY DR. HARDEEP VILLASENOR   • Pericarditis 1977   • Postmenopausal    • Right ovarian cyst 2002    S/P EXCISION       Past Surgical History:   Procedure Laterality Date   • BRAIN BIOPSY Right 9/6/2022    Procedure: RIGHT STEREOTACTIC  BRAIN BIOPSY WITH STEALTH;  Surgeon: Flavio Billingsley MD;  Location: [facility] MAIN OR;  Service: Neurosurgery;  Laterality: Right;   • COLONOSCOPY N/A 10/10/2019    5 MM TUBULAR ADENOMA POLYP IN SIGMOID, GRANULAR MUCOSA IN ILEOCECAL VALVE, BX: BENIGN, RESCOPE IN 5YRS, DR. AMARI CASTRO AT [facility]   • CYSTOSCOPY INSERTION / REMOVAL STENT / STONE N/A 10/31/2001    PLACEMENT OF LEFT DOUBLE J STENT, 6x24, DR. HARDEEP VILLASENOR AT [facility]   • LAPAROSCOPIC SALPINGOOPHERECTOMY Left 01/24/2002     AND EXCISION OF RIGHT OVARIAN CYST, DR. CHANDLER HAIR AT [facility]   • PARATHYROIDECTOMY  05/2020   • PELVIC LAPAROSCOPY      [facility]O   • TUBAL ABDOMINAL LIGATION Bilateral 1990    POSTPARTUM   • URETEROSCOPY STENT INSERTION N/A 07/13/2009    WITH STONE EXTRACTION, DR. HARDEEP VILLASENOR AT [facility]       Family History   Problem Relation Age of Onset   • Melanoma Mother    • Cancer Mother    • Coronary artery disease Father    • Prostate cancer Father    • Diabetes Father    • Heart disease Father    • No Known Problems Sister    • Cancer Brother    • Melanoma Brother    • Prostate cancer Brother    • No Known Problems Daughter    • No Known Problems Son    • No Known Problems Maternal Aunt    • No Known Problems Paternal Aunt    • Ovarian cancer Maternal Grandmother    • Cancer Maternal Grandmother    • Cancer Paternal Grandmother    • BRCA 1/2 Neg Hx    • Breast cancer Neg Hx    • Colon cancer Neg Hx    • Endometrial cancer Neg Hx    • Uterine cancer Neg Hx    • Malig Hyperthermia Neg Hx        Social History     Socioeconomic History   • Marital status:    Tobacco Use   • Smoking status: Never   • Smokeless tobacco: Never   Vaping Use   • Vaping Use: Never used   Substance and Sexual Activity   • Alcohol use: No   • Drug use: No   • Sexual activity: Yes     Partners: Male     Birth control/protection: Surgical, Post-menopausal     Comment: Tubal ligation       Review of Systems   Constitutional: Negative for fever.   Respiratory: Negative for shortness of  "breath.    Cardiovascular: Negative for chest pain.       Objective   Visit Vitals  /78 (BP Location: Left arm, Patient Position: Sitting)   Pulse 76   Temp 97.7 °F (36.5 °C)   Ht 170.2 cm (67.01\")   Wt 77.3 kg (170 lb 6.4 oz)   SpO2 97%   BMI 26.68 kg/m²     Body mass index is 26.68 kg/m².  Physical Exam  Constitutional:       Appearance: Normal appearance. She is well-developed.   Cardiovascular:      Rate and Rhythm: Normal rate and regular rhythm.      Heart sounds: Normal heart sounds.   Pulmonary:      Effort: Pulmonary effort is normal.      Breath sounds: Normal breath sounds.   Musculoskeletal:         General: No swelling. Normal range of motion.      Right lower leg: Edema present.      Left lower leg: Edema present.   Skin:     General: Skin is warm and dry.      Findings: No rash.   Neurological:      General: No focal deficit present.      Mental Status: She is alert and oriented to person, place, and time.   Psychiatric:         Mood and Affect: Mood normal.         Behavior: Behavior normal.           Assessment & Plan   Diagnoses and all orders for this visit:    1. Type 2 diabetes mellitus without complication, without long-term current use of insulin (HCC) (Primary)  -     Comprehensive Metabolic Panel  -     Lipid Panel  -     Hemoglobin A1c  -     Microalbumin / Creatinine Urine Ratio - Urine, Clean Catch  -     dapagliflozin Propanediol (Farxiga) 10 MG tablet; Take 10 mg by mouth Daily.  Dispense: 90 tablet; Refill: 3    2. Mixed hyperlipidemia    3. Hyperparathyroidism (HCC)  -     Vitamin D,25-Hydroxy    4. Glioblastoma of both frontal lobes (HCC)    5. Vasogenic edema (HCC)          Discussed foot propping. Has counselor. F/u in 3 months.      "

## 2022-12-13 LAB
25(OH)D3+25(OH)D2 SERPL-MCNC: 24.2 NG/ML (ref 30–100)
ALBUMIN SERPL-MCNC: 4.3 G/DL (ref 3.5–5.2)
ALBUMIN/CREAT UR: 8 MG/G CREAT (ref 0–29)
ALBUMIN/GLOB SERPL: 2.4 G/DL
ALP SERPL-CCNC: 78 U/L (ref 39–117)
ALT SERPL-CCNC: 18 U/L (ref 1–33)
AST SERPL-CCNC: 10 U/L (ref 1–32)
BILIRUB SERPL-MCNC: 0.2 MG/DL (ref 0–1.2)
BUN SERPL-MCNC: 14 MG/DL (ref 8–23)
BUN/CREAT SERPL: 24.6 (ref 7–25)
CALCIUM SERPL-MCNC: 9.1 MG/DL (ref 8.6–10.5)
CHLORIDE SERPL-SCNC: 102 MMOL/L (ref 98–107)
CHOLEST SERPL-MCNC: 232 MG/DL (ref 0–200)
CO2 SERPL-SCNC: 24.8 MMOL/L (ref 22–29)
CREAT SERPL-MCNC: 0.57 MG/DL (ref 0.57–1)
CREAT UR-MCNC: 74.9 MG/DL
EGFRCR SERPLBLD CKD-EPI 2021: 102.9 ML/MIN/1.73
GLOBULIN SER CALC-MCNC: 1.8 GM/DL
GLUCOSE SERPL-MCNC: 127 MG/DL (ref 65–99)
HBA1C MFR BLD: 6.5 % (ref 4.8–5.6)
HDLC SERPL-MCNC: 47 MG/DL (ref 40–60)
LDLC SERPL CALC-MCNC: 141 MG/DL (ref 0–100)
MICROALBUMIN UR-MCNC: 6.1 UG/ML
POTASSIUM SERPL-SCNC: 4.3 MMOL/L (ref 3.5–5.2)
PROT SERPL-MCNC: 6.1 G/DL (ref 6–8.5)
SODIUM SERPL-SCNC: 139 MMOL/L (ref 136–145)
TRIGL SERPL-MCNC: 243 MG/DL (ref 0–150)
VLDLC SERPL CALC-MCNC: 44 MG/DL (ref 5–40)

## 2022-12-14 DIAGNOSIS — E78.2 MIXED HYPERLIPIDEMIA: ICD-10-CM

## 2022-12-14 RX ORDER — PRAVASTATIN SODIUM 40 MG
40 TABLET ORAL DAILY
Qty: 90 TABLET | Refills: 3 | Status: SHIPPED | OUTPATIENT
Start: 2022-12-14

## 2022-12-21 DIAGNOSIS — E11.9 TYPE 2 DIABETES MELLITUS WITHOUT COMPLICATION, WITHOUT LONG-TERM CURRENT USE OF INSULIN: ICD-10-CM

## 2022-12-21 RX ORDER — METFORMIN HYDROCHLORIDE 500 MG/1
1000 TABLET, EXTENDED RELEASE ORAL 2 TIMES DAILY
Qty: 360 TABLET | Refills: 1 | Status: SHIPPED | OUTPATIENT
Start: 2022-12-21 | End: 2023-02-22 | Stop reason: SDUPTHER

## 2022-12-21 NOTE — TELEPHONE ENCOUNTER
Rx Refill Note  Requested Prescriptions     Pending Prescriptions Disp Refills   • metFORMIN ER (GLUCOPHAGE-XR) 500 MG 24 hr tablet 360 tablet 1     Sig: Take 2 tablets by mouth 2 (Two) Times a Day.      Last office visit with prescribing clinician: 12/12/2022   Last telemedicine visit with prescribing clinician: Visit date not found   Next office visit with prescribing clinician: Visit date not found

## 2023-02-22 DIAGNOSIS — E11.9 TYPE 2 DIABETES MELLITUS WITHOUT COMPLICATION, WITHOUT LONG-TERM CURRENT USE OF INSULIN: ICD-10-CM

## 2023-02-22 RX ORDER — METFORMIN HYDROCHLORIDE 500 MG/1
1000 TABLET, EXTENDED RELEASE ORAL 2 TIMES DAILY
Qty: 360 TABLET | Refills: 1 | Status: SHIPPED | OUTPATIENT
Start: 2023-02-22

## 2023-04-08 DIAGNOSIS — E78.2 MIXED HYPERLIPIDEMIA: ICD-10-CM

## 2023-04-10 RX ORDER — PRAVASTATIN SODIUM 20 MG
TABLET ORAL
Qty: 90 TABLET | Refills: 0 | OUTPATIENT
Start: 2023-04-10

## 2024-06-25 ENCOUNTER — TELEPHONE (OUTPATIENT)
Dept: SURGERY | Facility: CLINIC | Age: 64
End: 2024-06-25

## 2024-06-25 NOTE — TELEPHONE ENCOUNTER
"“Please be informed that patient has passed. Patient has been marked  in the system. The date of death is: 2023\".    Caller: Landry Carreon    Relationship: Emergency Contact    Best call back number: [phone]    Did the patient have surgery within 30 days of their passing (Y/N): * N  "

## (undated) DEVICE — GLV SURG SENSICARE W/ALOE PF LF 7 STRL

## (undated) DEVICE — DISPOSABLE IRRIGATION BIPOLAR CORD, M1000 TYPE: Brand: KIRWAN

## (undated) DEVICE — TOOL MR8-9AC75 MR8 9CM ACORN 7.5MM: Brand: MIDAS REX MR8

## (undated) DEVICE — TRAJ GUIDE KIT, 9733065, BIOPSY, EXT

## (undated) DEVICE — 3M™ STERI-DRAPE™ INSTRUMENT POUCH 1018: Brand: STERI-DRAPE™

## (undated) DEVICE — SOL ISO/ALC RUB 70PCT 4OZ

## (undated) DEVICE — CONTAINER,SPECIMEN,OR STERILE,4OZ: Brand: MEDLINE

## (undated) DEVICE — TUBING, SUCTION, 1/4" X 10', STRAIGHT: Brand: MEDLINE

## (undated) DEVICE — PK NEURO SPINE 40

## (undated) DEVICE — ELECTRD BLD EZ CLN MOD XLNG 2.75IN

## (undated) DEVICE — PATIENT RETURN ELECTRODE, SINGLE-USE, CONTACT QUALITY MONITORING, ADULT, WITH 9FT CORD, FOR PATIENTS WEIGING OVER 33LBS. (15KG): Brand: MEGADYNE

## (undated) DEVICE — ZYPHR DISPOSABLE CRANIAL PERFORATOR, LARGE 14/11MM: Brand: ZYPHR

## (undated) DEVICE — TOTAL TRAY, 16FR 10ML SIL FOLEY, URN: Brand: MEDLINE

## (undated) DEVICE — KT VLV BIOGUARD SXN BIOP AIR/H20 CONN 4PC DISP

## (undated) DEVICE — CANN NASL CO2 TRULINK W/O2 A/

## (undated) DEVICE — DISPOSABLE BIPOLAR CABLE 12FT. (3.6M): Brand: KIRWAN

## (undated) DEVICE — SUT ETHLN 2/0 PS 18IN 585H

## (undated) DEVICE — SMOKE EVACUATION TUBING WITH 7/8 IN TO 1/4 IN REDUCER: Brand: BUFFALO FILTER

## (undated) DEVICE — SINGLE-USE BIOPSY FORCEPS: Brand: RADIAL JAW 4

## (undated) DEVICE — PERFORATOR CDM WO/ DURAGUARD 14/11

## (undated) DEVICE — GLV SURG SENSICARE PI PF LF 7 GRN STRL

## (undated) DEVICE — STPCK 3/WY HP M/RA W/OFF/HNDL 1050PSI STRL

## (undated) DEVICE — SUT MNCRYL PLS ANTIB UD 4/0 PS2 18IN

## (undated) DEVICE — THE TORRENT IRRIGATION SCOPE CONNECTOR IS USED WITH THE TORRENT IRRIGATION TUBING TO PROVIDE IRRIGATION FLUIDS SUCH AS STERILE WATER DURING GASTROINTESTINAL ENDOSCOPIC PROCEDURES WHEN USED IN CONJUNCTION WITH AN IRRIGATION PUMP (OR ELECTROSURGICAL UNIT).: Brand: TORRENT

## (undated) DEVICE — CVR PROB 96IN LF STRL

## (undated) DEVICE — TUBING, SUCTION, 1/4" X 20', STRAIGHT: Brand: MEDLINE INDUSTRIES, INC.

## (undated) DEVICE — ANTIBACTERIAL UNDYED BRAIDED (POLYGLACTIN 910), SYNTHETIC ABSORBABLE SUTURE: Brand: COATED VICRYL

## (undated) DEVICE — SPHR MARKR STEALTH STATION

## (undated) DEVICE — SPNG GZ WOVN 4X4IN 12PLY 10/BX STRL

## (undated) DEVICE — CONN TBG Y 5 IN 1 LF STRL

## (undated) DEVICE — CODMAN® SURGICAL PATTIES 1" X 1" (2.54CM X 2.54CM): Brand: CODMAN®

## (undated) DEVICE — BIOPSY NEEDLE KIT 9733068 PASSIVE

## (undated) DEVICE — EXTENSION SET, MALE LUER LOCK ADAPTER WITH RETRACTABLE COLLAR

## (undated) DEVICE — DRSNG TELFA PAD NONADH STR 1S 3X8IN

## (undated) DEVICE — PENCL ES MEGADINE EZ/CLEAN BUTN W/HOLSTR 10FT

## (undated) DEVICE — CRANIOTOMY DRAPE, STERILE: Brand: MEDLINE

## (undated) DEVICE — APPL CHLORAPREP HI/LITE 26ML ORNG

## (undated) DEVICE — STPLR SKIN VISISTAT WD 35CT

## (undated) DEVICE — SENSR O2 OXIMAX FNGR A/ 18IN NONSTR

## (undated) DEVICE — MAYFIELD® DISPOSABLE ADULT SKULL PIN (PLASTIC BASE): Brand: MAYFIELD®